# Patient Record
Sex: FEMALE | Race: WHITE | NOT HISPANIC OR LATINO
[De-identification: names, ages, dates, MRNs, and addresses within clinical notes are randomized per-mention and may not be internally consistent; named-entity substitution may affect disease eponyms.]

---

## 2019-04-14 LAB
MEV IGG FLD QL IA: >300 AU/ML
MEV IGG+IGM SER-IMP: POSITIVE

## 2019-04-16 ENCOUNTER — RECORD ABSTRACTING (OUTPATIENT)
Age: 39
End: 2019-04-16

## 2019-04-16 DIAGNOSIS — Z86.39 PERSONAL HISTORY OF OTHER ENDOCRINE, NUTRITIONAL AND METABOLIC DISEASE: ICD-10-CM

## 2019-04-16 DIAGNOSIS — Z78.9 OTHER SPECIFIED HEALTH STATUS: ICD-10-CM

## 2019-04-16 DIAGNOSIS — Z83.49 FAMILY HISTORY OF OTHER ENDOCRINE, NUTRITIONAL AND METABOLIC DISEASES: ICD-10-CM

## 2019-04-16 DIAGNOSIS — Z80.3 FAMILY HISTORY OF MALIGNANT NEOPLASM OF BREAST: ICD-10-CM

## 2019-06-12 ENCOUNTER — NON-APPOINTMENT (OUTPATIENT)
Age: 39
End: 2019-06-12

## 2019-06-12 ENCOUNTER — LABORATORY RESULT (OUTPATIENT)
Age: 39
End: 2019-06-12

## 2019-06-12 ENCOUNTER — APPOINTMENT (OUTPATIENT)
Dept: ENDOCRINOLOGY | Facility: CLINIC | Age: 39
End: 2019-06-12
Payer: COMMERCIAL

## 2019-06-12 ENCOUNTER — APPOINTMENT (OUTPATIENT)
Dept: CARDIOLOGY | Facility: CLINIC | Age: 39
End: 2019-06-12
Payer: COMMERCIAL

## 2019-06-12 VITALS
HEIGHT: 59 IN | HEART RATE: 81 BPM | SYSTOLIC BLOOD PRESSURE: 100 MMHG | OXYGEN SATURATION: 99 % | TEMPERATURE: 98.7 F | DIASTOLIC BLOOD PRESSURE: 60 MMHG | BODY MASS INDEX: 27.21 KG/M2 | WEIGHT: 135 LBS

## 2019-06-12 VITALS
HEIGHT: 59 IN | BODY MASS INDEX: 27.21 KG/M2 | SYSTOLIC BLOOD PRESSURE: 100 MMHG | OXYGEN SATURATION: 99 % | HEART RATE: 81 BPM | WEIGHT: 135 LBS | TEMPERATURE: 98.7 F | DIASTOLIC BLOOD PRESSURE: 60 MMHG

## 2019-06-12 DIAGNOSIS — Z87.2 PERSONAL HISTORY OF DISEASES OF THE SKIN AND SUBCUTANEOUS TISSUE: ICD-10-CM

## 2019-06-12 DIAGNOSIS — R05 COUGH: ICD-10-CM

## 2019-06-12 DIAGNOSIS — Z86.79 PERSONAL HISTORY OF OTHER DISEASES OF THE CIRCULATORY SYSTEM: ICD-10-CM

## 2019-06-12 PROCEDURE — 99213 OFFICE O/P EST LOW 20 MIN: CPT

## 2019-06-12 PROCEDURE — 93000 ELECTROCARDIOGRAM COMPLETE: CPT

## 2019-06-12 PROCEDURE — 99396 PREV VISIT EST AGE 40-64: CPT

## 2019-06-12 NOTE — PHYSICAL EXAM
[Well Nourished] : well nourished [No Acute Distress] : no acute distress [Well Developed] : well developed [Well-Appearing] : well-appearing [Normal Sclera/Conjunctiva] : normal sclera/conjunctiva [PERRL] : pupils equal round and reactive to light [EOMI] : extraocular movements intact [Normal Outer Ear/Nose] : the outer ears and nose were normal in appearance [Normal Oropharynx] : the oropharynx was normal [No JVD] : no jugular venous distention [Supple] : supple [No Lymphadenopathy] : no lymphadenopathy [No Respiratory Distress] : no respiratory distress  [Clear to Auscultation] : lungs were clear to auscultation bilaterally [No Accessory Muscle Use] : no accessory muscle use [Regular Rhythm] : with a regular rhythm [Normal Rate] : normal rate  [Normal S1, S2] : normal S1 and S2 [No Murmur] : no murmur heard [No Carotid Bruits] : no carotid bruits [No Abdominal Bruit] : a ~M bruit was not heard ~T in the abdomen [No Varicosities] : no varicosities [Pedal Pulses Present] : the pedal pulses are present [No Edema] : there was no peripheral edema [No Extremity Clubbing/Cyanosis] : no extremity clubbing/cyanosis [No Palpable Aorta] : no palpable aorta [Soft] : abdomen soft [Non Tender] : non-tender [No Masses] : no abdominal mass palpated [Non-distended] : non-distended [No HSM] : no HSM [Normal Bowel Sounds] : normal bowel sounds [Normal Posterior Cervical Nodes] : no posterior cervical lymphadenopathy [Normal Anterior Cervical Nodes] : no anterior cervical lymphadenopathy [No CVA Tenderness] : no CVA  tenderness [No Spinal Tenderness] : no spinal tenderness [No Joint Swelling] : no joint swelling [Grossly Normal Strength/Tone] : grossly normal strength/tone [No Rash] : no rash [Normal Gait] : normal gait [Coordination Grossly Intact] : coordination grossly intact [Deep Tendon Reflexes (DTR)] : deep tendon reflexes were 2+ and symmetric [No Focal Deficits] : no focal deficits [Normal Affect] : the affect was normal [Normal Insight/Judgement] : insight and judgment were intact [de-identified] : goiter noted  [de-identified] : mild increased expiratory phase  [de-identified] : freckles /nevi and psoriatic rash elbows

## 2019-06-12 NOTE — HISTORY OF PRESENT ILLNESS
[FreeTextEntry1] : physical  [de-identified] : pt presents for yearly physical pt feels well except pt with lingering cough x 1 week intermittently productive .pt denies sob /n/v diaphoresis

## 2019-06-15 LAB
25(OH)D3 SERPL-MCNC: 31.3 NG/ML
ALBUMIN SERPL ELPH-MCNC: 4.7 G/DL
ALP BLD-CCNC: 62 U/L
ALT SERPL-CCNC: 18 U/L
ANION GAP SERPL CALC-SCNC: 11 MMOL/L
AST SERPL-CCNC: 15 U/L
BASOPHILS # BLD AUTO: 0.02 K/UL
BASOPHILS NFR BLD AUTO: 0.3 %
BILIRUB SERPL-MCNC: 0.2 MG/DL
BUN SERPL-MCNC: 13 MG/DL
CALCIUM SERPL-MCNC: 10 MG/DL
CHLORIDE SERPL-SCNC: 102 MMOL/L
CHOLEST SERPL-MCNC: 185 MG/DL
CHOLEST/HDLC SERPL: 2.9 RATIO
CO2 SERPL-SCNC: 27 MMOL/L
CREAT SERPL-MCNC: 0.71 MG/DL
EOSINOPHIL # BLD AUTO: 0.06 K/UL
EOSINOPHIL NFR BLD AUTO: 0.8 %
ESTIMATED AVERAGE GLUCOSE: 100 MG/DL
FERRITIN SERPL-MCNC: 38 NG/ML
FOLATE SERPL-MCNC: 13.5 NG/ML
GLUCOSE SERPL-MCNC: 82 MG/DL
HAPTOGLOB SERPL-MCNC: 140 MG/DL
HBA1C MFR BLD HPLC: 5.1 %
HCT VFR BLD CALC: 42.1 %
HDLC SERPL-MCNC: 65 MG/DL
HGB BLD-MCNC: 13.6 G/DL
IMM GRANULOCYTES NFR BLD AUTO: 0.3 %
IRON SERPL-MCNC: 55 UG/DL
LDH SERPL-CCNC: 148 U/L
LDLC SERPL CALC-MCNC: 106 MG/DL
LYMPHOCYTES # BLD AUTO: 1.89 K/UL
LYMPHOCYTES NFR BLD AUTO: 24.9 %
MAGNESIUM SERPL-MCNC: 2.1 MG/DL
MAN DIFF?: NORMAL
MCHC RBC-ENTMCNC: 29.1 PG
MCHC RBC-ENTMCNC: 32.3 GM/DL
MCV RBC AUTO: 90.1 FL
MONOCYTES # BLD AUTO: 0.51 K/UL
MONOCYTES NFR BLD AUTO: 6.7 %
NEUTROPHILS # BLD AUTO: 5.08 K/UL
NEUTROPHILS NFR BLD AUTO: 67 %
PHOSPHATE SERPL-MCNC: 3.7 MG/DL
PLATELET # BLD AUTO: 265 K/UL
POTASSIUM SERPL-SCNC: 4.5 MMOL/L
PROT SERPL-MCNC: 7 G/DL
RBC # BLD: 4.67 M/UL
RBC # FLD: 12.3 %
SODIUM SERPL-SCNC: 140 MMOL/L
T3RU NFR SERPL: 0.9 TBI
T4 FREE SERPL-MCNC: 1.2 NG/DL
T4 SERPL-MCNC: 8 UG/DL
TRANSFERRIN SERPL-MCNC: 239 MG/DL
TRIGL SERPL-MCNC: 72 MG/DL
TSH SERPL-ACNC: 1.51 UIU/ML
URATE SERPL-MCNC: 3.1 MG/DL
VIT B12 SERPL-MCNC: 583 PG/ML
WBC # FLD AUTO: 7.58 K/UL

## 2019-06-21 LAB
T3FREE SERPL-MCNC: 3.32 PG/ML
THYROGLOB AB SERPL-ACNC: <20 IU/ML
THYROPEROXIDASE AB SERPL IA-ACNC: 125 IU/ML
TSH RECEPTOR AB: 2.32 IU/L
TSI ACT/NOR SER: 0.46 IU/L

## 2019-07-27 NOTE — HISTORY OF PRESENT ILLNESS
[FreeTextEntry1] : Ms. NEVILLE  is a 38 year  year old female  who presents for initial endocrine evaluation. She presents with regard to a history of f graves disease. She becky have been on ptu about 10 years ago but has been off for many yrs with normal tft's.   She is currently taking  ocp\par Additional medical history is otherwise negative. She did have a heterogenous thyroid but lst several thyroid US's were normal. Denies anterior neck discomfort, difficulty swallowing, or any change in the appearance of the neck region. No ophthalmologic s/s except some mild incr in tearing\par \par \par

## 2019-07-27 NOTE — PHYSICAL EXAM
[No Acute Distress] : no acute distress [Alert] : alert [Normal Sclera/Conjunctiva] : normal sclera/conjunctiva [Well Developed] : well developed [Well Nourished] : well nourished [No Proptosis] : no proptosis [EOMI] : extra ocular movement intact [Normal Oropharynx] : the oropharynx was normal [No Respiratory Distress] : no respiratory distress [Thyroid Not Enlarged] : the thyroid was not enlarged [No Thyroid Nodules] : there were no palpable thyroid nodules [No Accessory Muscle Use] : no accessory muscle use [Clear to Auscultation] : lungs were clear to auscultation bilaterally [Normal Rate] : heart rate was normal  [Normal S1, S2] : normal S1 and S2 [Regular Rhythm] : with a regular rhythm [Pedal Pulses Normal] : the pedal pulses are present [No Edema] : there was no peripheral edema [Normal Bowel Sounds] : normal bowel sounds [Not Distended] : not distended [Soft] : abdomen soft [Not Tender] : non-tender [Axillary Nodes] : axillary nodes [Anterior Cervical Nodes] : anterior cervical nodes [Post Cervical Nodes] : posterior cervical nodes [No Spinal Tenderness] : no spinal tenderness [Normal] : normal and non tender [Spine Straight] : spine straight [No Stigmata of Cushings Syndrome] : no stigmata of cushings syndrome [Normal Gait] : normal gait [Normal Strength/Tone] : muscle strength and tone were normal [No Rash] : no rash [Normal Reflexes] : deep tendon reflexes were 2+ and symmetric [Oriented x3] : oriented to person, place, and time [No Tremors] : no tremors [Acanthosis Nigricans] : no acanthosis nigricans

## 2019-09-28 LAB
APPEARANCE: CLEAR
BACTERIA: NEGATIVE
BILIRUBIN URINE: NEGATIVE
BLOOD URINE: NEGATIVE
COLOR: COLORLESS
GLUCOSE QUALITATIVE U: NEGATIVE
HYALINE CASTS: 0 /LPF
KETONES URINE: NEGATIVE
LEUKOCYTE ESTERASE URINE: NEGATIVE
MICROSCOPIC-UA: NORMAL
NITRITE URINE: NEGATIVE
PH URINE: 7
PROTEIN URINE: NEGATIVE
RED BLOOD CELLS URINE: 2 /HPF
SPECIFIC GRAVITY URINE: 1
SQUAMOUS EPITHELIAL CELLS: 0 /HPF
UROBILINOGEN URINE: NORMAL
WHITE BLOOD CELLS URINE: 0 /HPF

## 2020-09-22 ENCOUNTER — NON-APPOINTMENT (OUTPATIENT)
Age: 40
End: 2020-09-22

## 2020-09-22 ENCOUNTER — LABORATORY RESULT (OUTPATIENT)
Age: 40
End: 2020-09-22

## 2020-09-22 ENCOUNTER — APPOINTMENT (OUTPATIENT)
Dept: CARDIOLOGY | Facility: CLINIC | Age: 40
End: 2020-09-22
Payer: COMMERCIAL

## 2020-09-22 VITALS
SYSTOLIC BLOOD PRESSURE: 100 MMHG | TEMPERATURE: 98.4 F | OXYGEN SATURATION: 99 % | BODY MASS INDEX: 27.42 KG/M2 | WEIGHT: 136 LBS | HEART RATE: 88 BPM | HEIGHT: 59 IN | DIASTOLIC BLOOD PRESSURE: 60 MMHG

## 2020-09-22 DIAGNOSIS — R07.89 OTHER CHEST PAIN: ICD-10-CM

## 2020-09-22 DIAGNOSIS — Z23 ENCOUNTER FOR IMMUNIZATION: ICD-10-CM

## 2020-09-22 DIAGNOSIS — E04.1 NONTOXIC SINGLE THYROID NODULE: ICD-10-CM

## 2020-09-22 DIAGNOSIS — Z20.828 CONTACT WITH AND (SUSPECTED) EXPOSURE TO OTHER VIRAL COMMUNICABLE DISEASES: ICD-10-CM

## 2020-09-22 PROCEDURE — 90686 IIV4 VACC NO PRSV 0.5 ML IM: CPT

## 2020-09-22 PROCEDURE — 93000 ELECTROCARDIOGRAM COMPLETE: CPT

## 2020-09-22 PROCEDURE — 99396 PREV VISIT EST AGE 40-64: CPT | Mod: 25

## 2020-09-22 PROCEDURE — 93306 TTE W/DOPPLER COMPLETE: CPT

## 2020-09-22 PROCEDURE — G0008: CPT

## 2020-09-22 NOTE — REVIEW OF SYSTEMS
[Chest Pain] : chest pain [Palpitations] : palpitations [Shortness Of Breath] : shortness of breath [Negative] : Heme/Lymph [Leg Claudication] : no leg claudication [Lower Ext Edema] : no lower extremity edema [Orthopnea] : no orthopnea [Paroxysmal Nocturnal Dyspnea] : no paroxysmal nocturnal dyspnea [Wheezing] : no wheezing [Cough] : no cough [Dyspnea on Exertion] : no dyspnea on exertion

## 2020-09-22 NOTE — PHYSICAL EXAM
[No Acute Distress] : no acute distress [Well Nourished] : well nourished [Well Developed] : well developed [Well-Appearing] : well-appearing [Normal Sclera/Conjunctiva] : normal sclera/conjunctiva [PERRL] : pupils equal round and reactive to light [EOMI] : extraocular movements intact [Normal Outer Ear/Nose] : the outer ears and nose were normal in appearance [Normal Oropharynx] : the oropharynx was normal [No JVD] : no jugular venous distention [No Lymphadenopathy] : no lymphadenopathy [Supple] : supple [Thyroid Normal, No Nodules] : the thyroid was normal and there were no nodules present [No Respiratory Distress] : no respiratory distress  [No Accessory Muscle Use] : no accessory muscle use [Clear to Auscultation] : lungs were clear to auscultation bilaterally [Normal Rate] : normal rate  [Regular Rhythm] : with a regular rhythm [Normal S1, S2] : normal S1 and S2 [No Murmur] : no murmur heard [No Carotid Bruits] : no carotid bruits [No Abdominal Bruit] : a ~M bruit was not heard ~T in the abdomen [No Varicosities] : no varicosities [Pedal Pulses Present] : the pedal pulses are present [No Edema] : there was no peripheral edema [No Palpable Aorta] : no palpable aorta [No Extremity Clubbing/Cyanosis] : no extremity clubbing/cyanosis [Soft] : abdomen soft [Non Tender] : non-tender [Non-distended] : non-distended [No Masses] : no abdominal mass palpated [No HSM] : no HSM [Normal Bowel Sounds] : normal bowel sounds [Normal Posterior Cervical Nodes] : no posterior cervical lymphadenopathy [Normal Anterior Cervical Nodes] : no anterior cervical lymphadenopathy [No CVA Tenderness] : no CVA  tenderness [No Spinal Tenderness] : no spinal tenderness [No Joint Swelling] : no joint swelling [Grossly Normal Strength/Tone] : grossly normal strength/tone [No Rash] : no rash [Coordination Grossly Intact] : coordination grossly intact [No Focal Deficits] : no focal deficits [Normal Gait] : normal gait [Normal Affect] : the affect was normal [Normal Insight/Judgement] : insight and judgment were intact [de-identified] : goiter noted  [de-identified] : nevi

## 2020-09-22 NOTE — HISTORY OF PRESENT ILLNESS
[FreeTextEntry1] : Annual Wellness Exam  [de-identified] : This is a 39 year old lady with a PMH of Hyperthyroidism and thyroid Nodules presents told her annual wellness exam. Patient states that for the last week she has noticed that her heart has been racing consistently. Patient admits to at times having to take a deep breath. Patient with chest heaviness that is an overall feeling. She states that it is not a specific isolated event. Patient denies nausea, vomiting, dizziness and lightheadedness.\par

## 2020-09-24 ENCOUNTER — APPOINTMENT (OUTPATIENT)
Dept: CARDIOLOGY | Facility: CLINIC | Age: 40
End: 2020-09-24
Payer: COMMERCIAL

## 2020-09-24 ENCOUNTER — NON-APPOINTMENT (OUTPATIENT)
Age: 40
End: 2020-09-24

## 2020-09-24 PROCEDURE — 93224 XTRNL ECG REC UP TO 48 HRS: CPT

## 2020-09-30 DIAGNOSIS — Z87.440 PERSONAL HISTORY OF URINARY (TRACT) INFECTIONS: ICD-10-CM

## 2020-10-13 ENCOUNTER — APPOINTMENT (OUTPATIENT)
Dept: CARDIOLOGY | Facility: CLINIC | Age: 40
End: 2020-10-13

## 2020-12-23 PROBLEM — Z87.440 HISTORY OF URINARY TRACT INFECTION: Status: RESOLVED | Noted: 2020-09-30 | Resolved: 2020-12-23

## 2020-12-27 LAB
25(OH)D3 SERPL-MCNC: 30.6 NG/ML
ANION GAP SERPL CALC-SCNC: 10 MMOL/L
ANION GAP SERPL CALC-SCNC: 11 MMOL/L
APPEARANCE: ABNORMAL
APPEARANCE: CLEAR
BACTERIA UR CULT: ABNORMAL
BACTERIA: NEGATIVE
BACTERIA: NEGATIVE
BASOPHILS # BLD AUTO: 0.03 K/UL
BASOPHILS NFR BLD AUTO: 0.4 %
BILIRUBIN URINE: NEGATIVE
BILIRUBIN URINE: NEGATIVE
BLOOD URINE: NEGATIVE
BLOOD URINE: NEGATIVE
BUN SERPL-MCNC: 14 MG/DL
BUN SERPL-MCNC: 15 MG/DL
CALCIUM OXALATE CRYSTALS: ABNORMAL
CALCIUM SERPL-MCNC: 9.4 MG/DL
CALCIUM SERPL-MCNC: 9.6 MG/DL
CHLORIDE SERPL-SCNC: 104 MMOL/L
CHLORIDE SERPL-SCNC: 105 MMOL/L
CHOLEST SERPL-MCNC: 210 MG/DL
CHOLEST/HDLC SERPL: 3.2 RATIO
CO2 SERPL-SCNC: 24 MMOL/L
CO2 SERPL-SCNC: 27 MMOL/L
COLOR: YELLOW
COLOR: YELLOW
CREAT SERPL-MCNC: 0.69 MG/DL
CREAT SERPL-MCNC: 0.7 MG/DL
EOSINOPHIL # BLD AUTO: 0.03 K/UL
EOSINOPHIL NFR BLD AUTO: 0.4 %
ESTIMATED AVERAGE GLUCOSE: 97 MG/DL
GLUCOSE QUALITATIVE U: NEGATIVE
GLUCOSE QUALITATIVE U: NEGATIVE
GLUCOSE SERPL-MCNC: 51 MG/DL
GLUCOSE SERPL-MCNC: 85 MG/DL
HBA1C MFR BLD HPLC: 5 %
HCG SERPL-MCNC: <1 MIU/ML
HCT VFR BLD CALC: 40.8 %
HDLC SERPL-MCNC: 66 MG/DL
HGB BLD-MCNC: 12.8 G/DL
HYALINE CASTS: 1 /LPF
HYALINE CASTS: 1 /LPF
IMM GRANULOCYTES NFR BLD AUTO: 0.3 %
KETONES URINE: NEGATIVE
KETONES URINE: NORMAL
LDLC SERPL CALC-MCNC: 125 MG/DL
LEUKOCYTE ESTERASE URINE: NEGATIVE
LEUKOCYTE ESTERASE URINE: NEGATIVE
LYMPHOCYTES # BLD AUTO: 2.18 K/UL
LYMPHOCYTES NFR BLD AUTO: 27.3 %
MAGNESIUM SERPL-MCNC: 2.3 MG/DL
MAN DIFF?: NORMAL
MCHC RBC-ENTMCNC: 28.8 PG
MCHC RBC-ENTMCNC: 31.4 GM/DL
MCV RBC AUTO: 91.7 FL
MICROSCOPIC-UA: NORMAL
MICROSCOPIC-UA: NORMAL
MONOCYTES # BLD AUTO: 0.65 K/UL
MONOCYTES NFR BLD AUTO: 8.1 %
NEUTROPHILS # BLD AUTO: 5.09 K/UL
NEUTROPHILS NFR BLD AUTO: 63.5 %
NITRITE URINE: NEGATIVE
NITRITE URINE: NEGATIVE
PH URINE: 6
PH URINE: 7
PHOSPHATE SERPL-MCNC: 3.6 MG/DL
PLATELET # BLD AUTO: 251 K/UL
POTASSIUM SERPL-SCNC: 3.7 MMOL/L
POTASSIUM SERPL-SCNC: 4.2 MMOL/L
PROTEIN URINE: NORMAL
PROTEIN URINE: NORMAL
RBC # BLD: 4.45 M/UL
RBC # FLD: 13.5 %
RED BLOOD CELLS URINE: 2 /HPF
RED BLOOD CELLS URINE: 7 /HPF
SARS-COV-2 IGG SERPL IA-ACNC: 6.12 INDEX
SARS-COV-2 IGG SERPL QL IA: POSITIVE
SODIUM SERPL-SCNC: 139 MMOL/L
SODIUM SERPL-SCNC: 143 MMOL/L
SPECIFIC GRAVITY URINE: 1.03
SPECIFIC GRAVITY URINE: 1.03
SQUAMOUS EPITHELIAL CELLS: 15 /HPF
SQUAMOUS EPITHELIAL CELLS: 2 /HPF
T3RU NFR SERPL: 1.2 TBI
T4 FREE SERPL-MCNC: 1.1 NG/DL
T4 SERPL-MCNC: 8 UG/DL
TRIGL SERPL-MCNC: 89 MG/DL
TSH SERPL-ACNC: 1.91 UIU/ML
URATE SERPL-MCNC: 3 MG/DL
UROBILINOGEN URINE: ABNORMAL
UROBILINOGEN URINE: NORMAL
WBC # FLD AUTO: 8 K/UL
WHITE BLOOD CELLS URINE: 1 /HPF
WHITE BLOOD CELLS URINE: 3 /HPF

## 2021-11-17 ENCOUNTER — NON-APPOINTMENT (OUTPATIENT)
Age: 41
End: 2021-11-17

## 2021-11-17 ENCOUNTER — APPOINTMENT (OUTPATIENT)
Dept: ENDOCRINOLOGY | Facility: CLINIC | Age: 41
End: 2021-11-17
Payer: COMMERCIAL

## 2021-11-17 ENCOUNTER — APPOINTMENT (OUTPATIENT)
Dept: CARDIOLOGY | Facility: CLINIC | Age: 41
End: 2021-11-17
Payer: COMMERCIAL

## 2021-11-17 VITALS
BODY MASS INDEX: 28.22 KG/M2 | SYSTOLIC BLOOD PRESSURE: 108 MMHG | OXYGEN SATURATION: 99 % | DIASTOLIC BLOOD PRESSURE: 60 MMHG | HEIGHT: 59 IN | HEART RATE: 76 BPM | WEIGHT: 140 LBS | TEMPERATURE: 98.4 F

## 2021-11-17 VITALS
WEIGHT: 140 LBS | BODY MASS INDEX: 28.22 KG/M2 | OXYGEN SATURATION: 99 % | SYSTOLIC BLOOD PRESSURE: 110 MMHG | HEIGHT: 59 IN | DIASTOLIC BLOOD PRESSURE: 65 MMHG | HEART RATE: 80 BPM

## 2021-11-17 DIAGNOSIS — L40.9 PSORIASIS, UNSPECIFIED: ICD-10-CM

## 2021-11-17 PROCEDURE — 99396 PREV VISIT EST AGE 40-64: CPT

## 2021-11-17 PROCEDURE — 36415 COLL VENOUS BLD VENIPUNCTURE: CPT

## 2021-11-17 PROCEDURE — 76536 US EXAM OF HEAD AND NECK: CPT

## 2021-11-17 PROCEDURE — 99214 OFFICE O/P EST MOD 30 MIN: CPT | Mod: 25

## 2021-11-17 PROCEDURE — 93000 ELECTROCARDIOGRAM COMPLETE: CPT

## 2021-11-17 NOTE — PROCEDURE
[SightCine e 2008 model, 10-12 MHz frequencies] : multiple real time longitudinal and transverse images were obtained using a high resolution ultrasound with a linear transducer, SightCine e 2008 model, 10-12 MHz frequencies. All measurements will be reported as longitudinal x jessee-posterior x transverse. [Multinodular Goiter] : multinodular goiter [] : a heterogeneous parenchyma [There are no distinct nodules visualized.] : There are no distinct nodules visualized. [FreeTextEntry1] : 2.42 x 1.55 x 1.71 [FreeTextEntry5] : 2.65 x 1.88 x 1.43 [FreeTextEntry2] : 0.37

## 2021-11-17 NOTE — PHYSICAL EXAM
[No Acute Distress] : no acute distress [Well Nourished] : well nourished [Well Developed] : well developed [Well-Appearing] : well-appearing [Normal Sclera/Conjunctiva] : normal sclera/conjunctiva [PERRL] : pupils equal round and reactive to light [EOMI] : extraocular movements intact [Normal Outer Ear/Nose] : the outer ears and nose were normal in appearance [Normal Oropharynx] : the oropharynx was normal [No JVD] : no jugular venous distention [No Lymphadenopathy] : no lymphadenopathy [Supple] : supple [Thyroid Normal, No Nodules] : the thyroid was normal and there were no nodules present [No Respiratory Distress] : no respiratory distress  [No Accessory Muscle Use] : no accessory muscle use [Clear to Auscultation] : lungs were clear to auscultation bilaterally [Normal Rate] : normal rate  [Regular Rhythm] : with a regular rhythm [Normal S1, S2] : normal S1 and S2 [No Murmur] : no murmur heard [No Carotid Bruits] : no carotid bruits [No Abdominal Bruit] : a ~M bruit was not heard ~T in the abdomen [No Varicosities] : no varicosities [Pedal Pulses Present] : the pedal pulses are present [No Edema] : there was no peripheral edema [No Palpable Aorta] : no palpable aorta [No Extremity Clubbing/Cyanosis] : no extremity clubbing/cyanosis [Soft] : abdomen soft [Non Tender] : non-tender [Non-distended] : non-distended [No Masses] : no abdominal mass palpated [No HSM] : no HSM [Normal Bowel Sounds] : normal bowel sounds [Normal Posterior Cervical Nodes] : no posterior cervical lymphadenopathy [Normal Anterior Cervical Nodes] : no anterior cervical lymphadenopathy [No CVA Tenderness] : no CVA  tenderness [No Spinal Tenderness] : no spinal tenderness [No Joint Swelling] : no joint swelling [Grossly Normal Strength/Tone] : grossly normal strength/tone [No Rash] : no rash [Coordination Grossly Intact] : coordination grossly intact [No Focal Deficits] : no focal deficits [Normal Gait] : normal gait [Deep Tendon Reflexes (DTR)] : deep tendon reflexes were 2+ and symmetric [Normal Affect] : the affect was normal [Normal Insight/Judgement] : insight and judgment were intact [de-identified] : mago

## 2021-11-17 NOTE — HISTORY OF PRESENT ILLNESS
[FreeTextEntry1] : Annual Wellness Exam [de-identified] : This is a 41 year old lady with a PMH of Hyperthyroidism and thyroid Nodules presents told her annual wellness exam. No acute complaints or concerns at today's visit. She overall feels well today. Denies chest pain, sob, dizziness, palpitations or nausea.

## 2021-11-17 NOTE — IMPRESSION
[FreeTextEntry1] : Heterogenous thyroid bilaterally without distinct nodularity and no goiter.  I have discussed this finding with the patient and have suggested  thyroid ultrasound follow up on an as needed basis.\par

## 2021-11-17 NOTE — PROCEDURE
[Cuciniale e 2008 model, 10-12 MHz frequencies] : multiple real time longitudinal and transverse images were obtained using a high resolution ultrasound with a linear transducer, Cuciniale e 2008 model, 10-12 MHz frequencies. All measurements will be reported as longitudinal x jessee-posterior x transverse. [Multinodular Goiter] : multinodular goiter [] : a heterogeneous parenchyma [There are no distinct nodules visualized.] : There are no distinct nodules visualized. [FreeTextEntry1] : 2.42 x 1.55 x 1.71 [FreeTextEntry5] : 2.65 x 1.88 x 1.43 [FreeTextEntry2] : 0.37

## 2021-11-21 DIAGNOSIS — R82.90 UNSPECIFIED ABNORMAL FINDINGS IN URINE: ICD-10-CM

## 2021-11-21 LAB
25(OH)D3 SERPL-MCNC: 30.4 NG/ML
ALBUMIN SERPL ELPH-MCNC: 4.4 G/DL
ALP BLD-CCNC: 56 U/L
ALT SERPL-CCNC: 15 U/L
ANION GAP SERPL CALC-SCNC: 11 MMOL/L
APPEARANCE: CLEAR
AST SERPL-CCNC: 14 U/L
BACTERIA UR CULT: NORMAL
BACTERIA: NEGATIVE
BASOPHILS # BLD AUTO: 0.02 K/UL
BASOPHILS NFR BLD AUTO: 0.3 %
BILIRUB SERPL-MCNC: 0.2 MG/DL
BILIRUBIN URINE: NEGATIVE
BLOOD URINE: ABNORMAL
BUN SERPL-MCNC: 13 MG/DL
CALCIUM SERPL-MCNC: 9.7 MG/DL
CHLORIDE SERPL-SCNC: 104 MMOL/L
CHOLEST SERPL-MCNC: 221 MG/DL
CO2 SERPL-SCNC: 24 MMOL/L
COLOR: NORMAL
COVID-19 NUCLEOCAPSID  GAM ANTIBODY INTERPRETATION: POSITIVE
CREAT SERPL-MCNC: 0.64 MG/DL
EOSINOPHIL # BLD AUTO: 0.06 K/UL
EOSINOPHIL NFR BLD AUTO: 0.9 %
FERRITIN SERPL-MCNC: 35 NG/ML
FOLATE SERPL-MCNC: 12.1 NG/ML
GLUCOSE QUALITATIVE U: NEGATIVE
GLUCOSE SERPL-MCNC: 81 MG/DL
HAPTOGLOB SERPL-MCNC: 110 MG/DL
HCT VFR BLD CALC: 40.4 %
HDLC SERPL-MCNC: 67 MG/DL
HGB BLD-MCNC: 12.6 G/DL
HYALINE CASTS: 1 /LPF
IMM GRANULOCYTES NFR BLD AUTO: 0.3 %
IRON SERPL-MCNC: 53 UG/DL
KETONES URINE: NEGATIVE
LDH SERPL-CCNC: 150 U/L
LDLC SERPL CALC-MCNC: 128 MG/DL
LEUKOCYTE ESTERASE URINE: NEGATIVE
LYMPHOCYTES # BLD AUTO: 1.77 K/UL
LYMPHOCYTES NFR BLD AUTO: 25.1 %
MAGNESIUM SERPL-MCNC: 2.1 MG/DL
MAN DIFF?: NORMAL
MCHC RBC-ENTMCNC: 28.4 PG
MCHC RBC-ENTMCNC: 31.2 GM/DL
MCV RBC AUTO: 91.2 FL
MICROSCOPIC-UA: NORMAL
MONOCYTES # BLD AUTO: 0.52 K/UL
MONOCYTES NFR BLD AUTO: 7.4 %
NEUTROPHILS # BLD AUTO: 4.65 K/UL
NEUTROPHILS NFR BLD AUTO: 66 %
NITRITE URINE: NEGATIVE
NONHDLC SERPL-MCNC: 154 MG/DL
PH URINE: 7
PHOSPHATE SERPL-MCNC: 3.6 MG/DL
PLATELET # BLD AUTO: 270 K/UL
POTASSIUM SERPL-SCNC: 4.3 MMOL/L
PROT SERPL-MCNC: 6.9 G/DL
PROTEIN URINE: NEGATIVE
RBC # BLD: 4.43 M/UL
RBC # FLD: 12.8 %
RED BLOOD CELLS URINE: 4 /HPF
SARS-COV-2 AB SERPL QL IA: 1.27 INDEX
SODIUM SERPL-SCNC: 139 MMOL/L
SPECIFIC GRAVITY URINE: 1.01
SQUAMOUS EPITHELIAL CELLS: 1 /HPF
TRANSFERRIN SERPL-MCNC: 272 MG/DL
TRIGL SERPL-MCNC: 128 MG/DL
UROBILINOGEN URINE: NORMAL
VIT B12 SERPL-MCNC: 472 PG/ML
WBC # FLD AUTO: 7.04 K/UL
WHITE BLOOD CELLS URINE: 1 /HPF

## 2021-11-22 LAB
T3FREE SERPL-MCNC: 2.67 PG/ML
T4 FREE SERPL-MCNC: 1 NG/DL
THYROGLOB AB SERPL-ACNC: 23 IU/ML
THYROPEROXIDASE AB SERPL IA-ACNC: 208 IU/ML
TSH SERPL-ACNC: 1.92 UIU/ML

## 2021-11-25 NOTE — HISTORY OF PRESENT ILLNESS
[FreeTextEntry1] : Ms. NEVILLE is a 41 year old female who returns today for endocrine reevaluation. She presents with regard to a history of f graves disease. She may have been on ptu about 10 years ago but has been off for many yrs with normal tft's. She is currently taking ocp.\par Additional medical history is otherwise negative. She did have a heterogenous thyroid but lst several thyroid US's were normal. Denies anterior neck discomfort, difficulty swallowing, or any change in the appearance of the neck region. No ophthalmologic s/s except some mild incr in tearing.\par Taking Zyrtec, and vitamin D intermittently.\par

## 2023-01-18 ENCOUNTER — LABORATORY RESULT (OUTPATIENT)
Age: 43
End: 2023-01-18

## 2023-01-18 ENCOUNTER — APPOINTMENT (OUTPATIENT)
Dept: CARDIOLOGY | Facility: CLINIC | Age: 43
End: 2023-01-18
Payer: COMMERCIAL

## 2023-01-18 ENCOUNTER — NON-APPOINTMENT (OUTPATIENT)
Age: 43
End: 2023-01-18

## 2023-01-18 VITALS
SYSTOLIC BLOOD PRESSURE: 103 MMHG | HEIGHT: 59 IN | OXYGEN SATURATION: 98 % | BODY MASS INDEX: 27.82 KG/M2 | WEIGHT: 138 LBS | TEMPERATURE: 97.8 F | DIASTOLIC BLOOD PRESSURE: 60 MMHG | HEART RATE: 88 BPM

## 2023-01-18 DIAGNOSIS — Z12.39 ENCOUNTER FOR OTHER SCREENING FOR MALIGNANT NEOPLASM OF BREAST: ICD-10-CM

## 2023-01-18 DIAGNOSIS — Z00.00 ENCOUNTER FOR GENERAL ADULT MEDICAL EXAMINATION W/OUT ABNORMAL FINDINGS: ICD-10-CM

## 2023-01-18 DIAGNOSIS — D22.9 MELANOCYTIC NEVI, UNSPECIFIED: ICD-10-CM

## 2023-01-18 DIAGNOSIS — R82.90 UNSPECIFIED ABNORMAL FINDINGS IN URINE: ICD-10-CM

## 2023-01-18 PROCEDURE — 93000 ELECTROCARDIOGRAM COMPLETE: CPT

## 2023-01-18 PROCEDURE — 99396 PREV VISIT EST AGE 40-64: CPT

## 2023-01-18 RX ORDER — BENZONATATE 200 MG/1
200 CAPSULE ORAL
Qty: 40 | Refills: 0 | Status: DISCONTINUED | COMMUNITY
Start: 2019-06-12 | End: 2023-01-18

## 2023-01-18 RX ORDER — GUAIFENESIN 600 MG/1
600 TABLET, EXTENDED RELEASE ORAL
Refills: 0 | Status: DISCONTINUED | COMMUNITY
Start: 2019-06-12 | End: 2023-01-18

## 2023-01-18 RX ORDER — AZITHROMYCIN 250 MG/1
250 TABLET, FILM COATED ORAL
Qty: 1 | Refills: 0 | Status: DISCONTINUED | COMMUNITY
Start: 2019-06-12 | End: 2023-01-18

## 2023-01-18 RX ORDER — NITROFURANTOIN (MONOHYDRATE/MACROCRYSTALS) 25; 75 MG/1; MG/1
100 CAPSULE ORAL
Qty: 14 | Refills: 0 | Status: DISCONTINUED | COMMUNITY
Start: 2020-09-30 | End: 2023-01-18

## 2023-01-18 NOTE — PHYSICAL EXAM
[No Acute Distress] : no acute distress [Well Nourished] : well nourished [Well Developed] : well developed [Well-Appearing] : well-appearing [Normal Sclera/Conjunctiva] : normal sclera/conjunctiva [PERRL] : pupils equal round and reactive to light [EOMI] : extraocular movements intact [Normal Outer Ear/Nose] : the outer ears and nose were normal in appearance [Normal Oropharynx] : the oropharynx was normal [No JVD] : no jugular venous distention [No Lymphadenopathy] : no lymphadenopathy [Supple] : supple [Thyroid Normal, No Nodules] : the thyroid was normal and there were no nodules present [No Respiratory Distress] : no respiratory distress  [No Accessory Muscle Use] : no accessory muscle use [Clear to Auscultation] : lungs were clear to auscultation bilaterally [Normal Rate] : normal rate  [Regular Rhythm] : with a regular rhythm [Normal S1, S2] : normal S1 and S2 [No Murmur] : no murmur heard [No Carotid Bruits] : no carotid bruits [No Abdominal Bruit] : a ~M bruit was not heard ~T in the abdomen [No Varicosities] : no varicosities [Pedal Pulses Present] : the pedal pulses are present [No Edema] : there was no peripheral edema [No Palpable Aorta] : no palpable aorta [No Extremity Clubbing/Cyanosis] : no extremity clubbing/cyanosis [Soft] : abdomen soft [Non Tender] : non-tender [Non-distended] : non-distended [No Masses] : no abdominal mass palpated [No HSM] : no HSM [Normal Bowel Sounds] : normal bowel sounds [Normal Posterior Cervical Nodes] : no posterior cervical lymphadenopathy [Normal Anterior Cervical Nodes] : no anterior cervical lymphadenopathy [No CVA Tenderness] : no CVA  tenderness [No Spinal Tenderness] : no spinal tenderness [No Joint Swelling] : no joint swelling [Grossly Normal Strength/Tone] : grossly normal strength/tone [No Rash] : no rash [Coordination Grossly Intact] : coordination grossly intact [No Focal Deficits] : no focal deficits [Normal Gait] : normal gait [Deep Tendon Reflexes (DTR)] : deep tendon reflexes were 2+ and symmetric [Normal Affect] : the affect was normal [Normal Insight/Judgement] : insight and judgment were intact [de-identified] : nevi

## 2023-01-18 NOTE — HISTORY OF PRESENT ILLNESS
[FreeTextEntry1] : Here for follow up  [de-identified] : This is a 41 y/o female with a pmhx of Hyperthyroidism and thyroid Nodules presents told her annual wellness exam. Patient feels well and has no acute concerns or complaints. Patient denies chest pain, dyspnea, palpitations, dizziness, syncope, changes in bowel/bladder habits or appetite.

## 2023-04-24 ENCOUNTER — APPOINTMENT (OUTPATIENT)
Dept: ORTHOPEDIC SURGERY | Facility: CLINIC | Age: 43
End: 2023-04-24
Payer: COMMERCIAL

## 2023-04-24 VITALS — HEIGHT: 59 IN | WEIGHT: 136 LBS | BODY MASS INDEX: 27.42 KG/M2

## 2023-04-24 PROCEDURE — 73630 X-RAY EXAM OF FOOT: CPT | Mod: 50

## 2023-04-24 PROCEDURE — 99204 OFFICE O/P NEW MOD 45 MIN: CPT

## 2023-04-24 PROCEDURE — 99203 OFFICE O/P NEW LOW 30 MIN: CPT

## 2023-04-24 NOTE — PHYSICAL EXAM
[Left] : left foot and ankle [Right] : right foot and ankle [NL (40)] : plantar flexion 40 degrees [NL (20)] : eversion 20 degrees [5___] : [facility] 5[unfilled]/5 [2+] : posterior tibialis pulse: 2+ [Normal] : saphenous nerve sensation normal [Bilateral] : foot bilaterally [Weight -] : weightbearing [] : non-antalgic [FreeTextEntry3] : Pes planus, LT worse than RT. There is hind foot valgus on the LT compared to the RT.  [de-identified] : There is pes planus, talonavicular joint narrowing with dorsal osteophyte formation on the LT. 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  [TWNoteComboBox7] : dorsiflexion 10 degrees [de-identified] : inversion 20 degrees

## 2023-04-24 NOTE — HISTORY OF PRESENT ILLNESS
[Gradual] : gradual [8] : 8 [0] : 0 [Occasional] : occasional [Standing] : standing [Walking] : walking [de-identified] : Pt is a 42 year old F who presents today for evaluation of both feet, LT worse than RT. Denies trauma. Symptoms x years. WB without assistive device. [] : no [FreeTextEntry1] : R/L foot

## 2023-05-03 ENCOUNTER — APPOINTMENT (OUTPATIENT)
Dept: CT IMAGING | Facility: CLINIC | Age: 43
End: 2023-05-03

## 2023-05-15 ENCOUNTER — APPOINTMENT (OUTPATIENT)
Dept: ORTHOPEDIC SURGERY | Facility: CLINIC | Age: 43
End: 2023-05-15
Payer: COMMERCIAL

## 2023-05-15 DIAGNOSIS — M79.671 PAIN IN RIGHT FOOT: ICD-10-CM

## 2023-05-15 DIAGNOSIS — M79.672 PAIN IN LEFT FOOT: ICD-10-CM

## 2023-05-15 DIAGNOSIS — M25.672 STIFFNESS OF LEFT ANKLE, NOT ELSEWHERE CLASSIFIED: ICD-10-CM

## 2023-05-15 PROCEDURE — 99213 OFFICE O/P EST LOW 20 MIN: CPT

## 2023-05-15 NOTE — HISTORY OF PRESENT ILLNESS
[Gradual] : gradual [8] : 8 [0] : 0 [Dull/Aching] : dull/aching [Occasional] : occasional [Standing] : standing [Walking] : walking [de-identified] : Patient presents today for follow up of both feet, LT worse than RT. Denies trauma. Symptoms x years. WB without assistive device.  She has tried nsaids, icing  and home exercises without improvement.  She is using otc inserts with some benefit.\par CT scan  got denied by insurance. \par  [] : no [FreeTextEntry1] : R/L foot

## 2023-05-15 NOTE — ASSESSMENT
[FreeTextEntry1] : Patient has failed conservative treatment  of home ROM exercises, icing and nsaids without improvement. \par \par She is still recommend CT LT ankle to evaluate for tarsal coalition. \par \par WBAT in supportive footwear.\par Ice to affected area, NSAIDS prn.\par \par Rx for custom orthotics given today.\par \par Further treatment pending CT results. \par \par \par \par

## 2023-05-15 NOTE — PHYSICAL EXAM
[Left] : left foot and ankle [Right] : right foot and ankle [NL (40)] : plantar flexion 40 degrees [NL (20)] : eversion 20 degrees [5___] : [facility] 5[unfilled]/5 [2+] : posterior tibialis pulse: 2+ [Normal] : saphenous nerve sensation normal [Bilateral] : foot bilaterally [Weight -] : weightbearing [de-identified] : There is pes planus, talonavicular joint narrowing with dorsal osteophyte formation on the LT. 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  [] : non-antalgic [FreeTextEntry3] : Pes planus, LT worse than RT. There is hind foot valgus on the LT compared to the RT.  [TWNoteComboBox7] : dorsiflexion 10 degrees [de-identified] : inversion 20 degrees

## 2023-05-23 ENCOUNTER — APPOINTMENT (OUTPATIENT)
Dept: ORTHOPEDIC SURGERY | Facility: CLINIC | Age: 43
End: 2023-05-23

## 2023-05-30 ENCOUNTER — FORM ENCOUNTER (OUTPATIENT)
Age: 43
End: 2023-05-30

## 2023-05-31 ENCOUNTER — APPOINTMENT (OUTPATIENT)
Dept: CT IMAGING | Facility: CLINIC | Age: 43
End: 2023-05-31
Payer: COMMERCIAL

## 2023-05-31 PROCEDURE — 73700 CT LOWER EXTREMITY W/O DYE: CPT | Mod: LT

## 2023-05-31 PROCEDURE — 76376 3D RENDER W/INTRP POSTPROCES: CPT | Mod: LT

## 2023-06-12 ENCOUNTER — APPOINTMENT (OUTPATIENT)
Dept: ORTHOPEDIC SURGERY | Facility: CLINIC | Age: 43
End: 2023-06-12
Payer: COMMERCIAL

## 2023-06-12 VITALS — BODY MASS INDEX: 27.42 KG/M2 | HEIGHT: 59 IN | WEIGHT: 136 LBS

## 2023-06-12 DIAGNOSIS — M21.42 FLAT FOOT [PES PLANUS] (ACQUIRED), RIGHT FOOT: ICD-10-CM

## 2023-06-12 DIAGNOSIS — Q66.89 OTHER SPECIFIED CONGENITAL DEFORMITIES OF FEET: ICD-10-CM

## 2023-06-12 DIAGNOSIS — M21.41 FLAT FOOT [PES PLANUS] (ACQUIRED), RIGHT FOOT: ICD-10-CM

## 2023-06-12 PROCEDURE — 99214 OFFICE O/P EST MOD 30 MIN: CPT

## 2023-06-12 NOTE — DATA REVIEWED
[CT Scan] : CT scan [Left] : left [Ankle] : ankle [Report was reviewed and noted in the chart] : The report was reviewed and noted in the chart [I independently reviewed and interpreted images and report] : I independently reviewed and interpreted images and report [FreeTextEntry1] : 1. 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 Mild plantar calcaneal bone spurs and small type 1 accessory navicular bone.\par 3. Nonspecific mild subcutaneous swelling ventral to the ankle superiorly.\par 4. Small bony islands in the medial cuneiform of no clinical significance.

## 2023-06-12 NOTE — HISTORY OF PRESENT ILLNESS
[Gradual] : gradual [5] : 5 [0] : 0 [Dull/Aching] : dull/aching [Occasional] : occasional [Standing] : standing [Walking] : walking [de-identified] : Patient presents today for follow up of both feet, LT worse than RT. Denies trauma. Symptoms x years. WB without assistive device.  She has tried nsaids, icing  and home exercises without improvement. She did not get custom orthotics. She went for CT study since last visit.  [] : no [FreeTextEntry1] : R/L foot

## 2023-06-12 NOTE — PHYSICAL EXAM
[Left] : left foot and ankle [Right] : right foot and ankle [NL (40)] : plantar flexion 40 degrees [5___] : eversion 5[unfilled]/5 [2+] : posterior tibialis pulse: 2+ [Normal] : saphenous nerve sensation normal [NL (20)] : eversion 20 degrees [4___] : inversion 4[unfilled]/5 [NL 30)] : inversion 30 degrees [] : non-antalgic [FreeTextEntry3] : Pes planus, LT worse than RT. There is hind foot valgus on the LT compared to the RT.  [FreeTextEntry8] : ttp medial subtalar joint  [TWNoteComboBox7] : dorsiflexion 10 degrees [de-identified] : inversion 20 degrees

## 2023-06-12 NOTE — ASSESSMENT
[FreeTextEntry1] : CT report and films reviewed with patient.\par \par WBAT in supportive footwear.\par Ice to affected area, NSAIDS prn.\par \par Again, recommend custom orthotics.\par \par \par \par

## 2023-09-28 ENCOUNTER — APPOINTMENT (OUTPATIENT)
Dept: INTERNAL MEDICINE | Facility: CLINIC | Age: 43
End: 2023-09-28

## 2023-10-16 ENCOUNTER — APPOINTMENT (OUTPATIENT)
Dept: ENDOCRINOLOGY | Facility: CLINIC | Age: 43
End: 2023-10-16
Payer: COMMERCIAL

## 2023-10-16 ENCOUNTER — LABORATORY RESULT (OUTPATIENT)
Age: 43
End: 2023-10-16

## 2023-10-16 ENCOUNTER — APPOINTMENT (OUTPATIENT)
Dept: CARDIOLOGY | Facility: CLINIC | Age: 43
End: 2023-10-16
Payer: COMMERCIAL

## 2023-10-16 VITALS
HEIGHT: 59 IN | SYSTOLIC BLOOD PRESSURE: 110 MMHG | DIASTOLIC BLOOD PRESSURE: 80 MMHG | TEMPERATURE: 98.3 F | WEIGHT: 135 LBS | OXYGEN SATURATION: 97 % | BODY MASS INDEX: 27.21 KG/M2 | HEART RATE: 95 BPM

## 2023-10-16 VITALS
RESPIRATION RATE: 18 BRPM | HEIGHT: 59 IN | SYSTOLIC BLOOD PRESSURE: 110 MMHG | WEIGHT: 135 LBS | TEMPERATURE: 98.3 F | HEART RATE: 95 BPM | DIASTOLIC BLOOD PRESSURE: 80 MMHG | BODY MASS INDEX: 27.21 KG/M2 | OXYGEN SATURATION: 97 %

## 2023-10-16 DIAGNOSIS — R53.83 OTHER FATIGUE: ICD-10-CM

## 2023-10-16 DIAGNOSIS — Z71.85 ENCOUNTER FOR IMMUNIZATION SAFETY COUNSELING: ICD-10-CM

## 2023-10-16 DIAGNOSIS — E04.9 NONTOXIC GOITER, UNSPECIFIED: ICD-10-CM

## 2023-10-16 DIAGNOSIS — E55.9 VITAMIN D DEFICIENCY, UNSPECIFIED: ICD-10-CM

## 2023-10-16 DIAGNOSIS — R00.2 PALPITATIONS: ICD-10-CM

## 2023-10-16 DIAGNOSIS — E04.1 NONTOXIC SINGLE THYROID NODULE: ICD-10-CM

## 2023-10-16 DIAGNOSIS — R06.09 OTHER FORMS OF DYSPNEA: ICD-10-CM

## 2023-10-16 DIAGNOSIS — E78.00 PURE HYPERCHOLESTEROLEMIA, UNSPECIFIED: ICD-10-CM

## 2023-10-16 DIAGNOSIS — Z13.228 ENCOUNTER FOR SCREENING FOR OTHER METABOLIC DISORDERS: ICD-10-CM

## 2023-10-16 PROCEDURE — 93306 TTE W/DOPPLER COMPLETE: CPT

## 2023-10-16 PROCEDURE — 93000 ELECTROCARDIOGRAM COMPLETE: CPT

## 2023-10-16 PROCEDURE — 99214 OFFICE O/P EST MOD 30 MIN: CPT | Mod: 25

## 2023-10-16 PROCEDURE — 99214 OFFICE O/P EST MOD 30 MIN: CPT

## 2023-10-18 PROCEDURE — 93224 XTRNL ECG REC UP TO 48 HRS: CPT

## 2023-10-20 LAB
ALBUMIN SERPL ELPH-MCNC: 4.2 G/DL
ALP BLD-CCNC: 58 U/L
ALT SERPL-CCNC: 23 U/L
ANION GAP SERPL CALC-SCNC: 14 MMOL/L
AST SERPL-CCNC: 21 U/L
BASOPHILS # BLD AUTO: 0.01 K/UL
BASOPHILS NFR BLD AUTO: 0.2 %
BILIRUB SERPL-MCNC: 0.4 MG/DL
BUN SERPL-MCNC: 14 MG/DL
CALCIUM SERPL-MCNC: 10 MG/DL
CHLORIDE SERPL-SCNC: 101 MMOL/L
CO2 SERPL-SCNC: 24 MMOL/L
CREAT SERPL-MCNC: 0.56 MG/DL
CRP SERPL-MCNC: 4 MG/L
EGFR: 115 ML/MIN/1.73M2
EOSINOPHIL # BLD AUTO: 0.03 K/UL
EOSINOPHIL NFR BLD AUTO: 0.5 %
ERYTHROCYTE [SEDIMENTATION RATE] IN BLOOD BY WESTERGREN METHOD: 17 MM/HR
GLUCOSE SERPL-MCNC: 72 MG/DL
HCT VFR BLD CALC: 42.4 %
HGB BLD-MCNC: 13.6 G/DL
IMM GRANULOCYTES NFR BLD AUTO: 0.2 %
LYMPHOCYTES # BLD AUTO: 1.71 K/UL
LYMPHOCYTES NFR BLD AUTO: 26.8 %
MAN DIFF?: NORMAL
MCHC RBC-ENTMCNC: 27.5 PG
MCHC RBC-ENTMCNC: 32.1 GM/DL
MCV RBC AUTO: 85.7 FL
MONOCYTES # BLD AUTO: 0.57 K/UL
MONOCYTES NFR BLD AUTO: 8.9 %
NEUTROPHILS # BLD AUTO: 4.06 K/UL
NEUTROPHILS NFR BLD AUTO: 63.4 %
PLATELET # BLD AUTO: 273 K/UL
POTASSIUM SERPL-SCNC: 4.2 MMOL/L
PROT SERPL-MCNC: 7.1 G/DL
RBC # BLD: 4.95 M/UL
RBC # FLD: 13.2 %
SODIUM SERPL-SCNC: 139 MMOL/L
T3FREE SERPL-MCNC: 6.86 PG/ML
T4 FREE SERPL-MCNC: 2 NG/DL
TSH RECEPTOR AB: 3.72 IU/L
TSH SERPL-ACNC: <0.01 UIU/ML
TSI ACT/NOR SER: 3.69 IU/L
WBC # FLD AUTO: 6.39 K/UL

## 2023-10-23 PROBLEM — E78.00 ELEVATED CHOLESTEROL: Status: ACTIVE | Noted: 2023-10-23

## 2023-10-23 LAB
25(OH)D3 SERPL-MCNC: 29.4 NG/ML
APPEARANCE: CLEAR
BACTERIA: NEGATIVE /HPF
BILIRUBIN URINE: NEGATIVE
BLOOD URINE: NEGATIVE
CAST: 0 /LPF
CHOLEST SERPL-MCNC: 235 MG/DL
COLOR: YELLOW
EPITHELIAL CELLS: 3 /HPF
ESTIMATED AVERAGE GLUCOSE: 103 MG/DL
FERRITIN SERPL-MCNC: 16 NG/ML
GLUCOSE QUALITATIVE U: NEGATIVE MG/DL
HBA1C MFR BLD HPLC: 5.2 %
HCG SERPL-MCNC: <1 MIU/ML
HDLC SERPL-MCNC: 73 MG/DL
IRON SATN MFR SERPL: 34 %
IRON SERPL-MCNC: 120 UG/DL
KETONES URINE: NEGATIVE MG/DL
LDLC SERPL CALC-MCNC: 145 MG/DL
LEUKOCYTE ESTERASE URINE: NEGATIVE
MAGNESIUM SERPL-MCNC: 1.9 MG/DL
MICROSCOPIC-UA: NORMAL
NITRITE URINE: NEGATIVE
NONHDLC SERPL-MCNC: 162 MG/DL
PH URINE: 7.5
PROTEIN URINE: NEGATIVE MG/DL
RED BLOOD CELLS URINE: 1 /HPF
SPECIFIC GRAVITY URINE: 1.02
TIBC SERPL-MCNC: 351 UG/DL
TRIGL SERPL-MCNC: 100 MG/DL
UIBC SERPL-MCNC: 231 UG/DL
UROBILINOGEN URINE: 0.2 MG/DL
WHITE BLOOD CELLS URINE: 0 /HPF

## 2023-11-01 RX ORDER — METOPROLOL TARTRATE 25 MG/1
25 TABLET, FILM COATED ORAL TWICE DAILY
Qty: 60 | Refills: 1 | Status: ACTIVE | COMMUNITY
Start: 2023-10-16 | End: 1900-01-01

## 2023-11-27 ENCOUNTER — INPATIENT (INPATIENT)
Facility: HOSPITAL | Age: 43
LOS: 3 days | Discharge: HOME | DRG: 419 | End: 2023-12-01
Attending: SURGERY | Admitting: SURGERY
Payer: COMMERCIAL

## 2023-11-27 VITALS
WEIGHT: 134.92 LBS | SYSTOLIC BLOOD PRESSURE: 106 MMHG | OXYGEN SATURATION: 96 % | RESPIRATION RATE: 16 BRPM | DIASTOLIC BLOOD PRESSURE: 68 MMHG | HEART RATE: 73 BPM | HEIGHT: 59 IN | TEMPERATURE: 98 F

## 2023-11-27 LAB
ALBUMIN SERPL ELPH-MCNC: 4.4 G/DL — SIGNIFICANT CHANGE UP (ref 3.3–5)
ALBUMIN SERPL ELPH-MCNC: 4.4 G/DL — SIGNIFICANT CHANGE UP (ref 3.3–5)
ALP SERPL-CCNC: 105 U/L — SIGNIFICANT CHANGE UP (ref 40–120)
ALP SERPL-CCNC: 105 U/L — SIGNIFICANT CHANGE UP (ref 40–120)
ALT FLD-CCNC: 181 U/L — HIGH (ref 10–45)
ALT FLD-CCNC: 181 U/L — HIGH (ref 10–45)
ANION GAP SERPL CALC-SCNC: 12 MMOL/L — SIGNIFICANT CHANGE UP (ref 5–17)
ANION GAP SERPL CALC-SCNC: 12 MMOL/L — SIGNIFICANT CHANGE UP (ref 5–17)
AST SERPL-CCNC: 93 U/L — HIGH (ref 10–40)
AST SERPL-CCNC: 93 U/L — HIGH (ref 10–40)
BASOPHILS # BLD AUTO: 0.01 K/UL — SIGNIFICANT CHANGE UP (ref 0–0.2)
BASOPHILS # BLD AUTO: 0.01 K/UL — SIGNIFICANT CHANGE UP (ref 0–0.2)
BASOPHILS NFR BLD AUTO: 0.2 % — SIGNIFICANT CHANGE UP (ref 0–2)
BASOPHILS NFR BLD AUTO: 0.2 % — SIGNIFICANT CHANGE UP (ref 0–2)
BILIRUB SERPL-MCNC: 0.4 MG/DL — SIGNIFICANT CHANGE UP (ref 0.2–1.2)
BILIRUB SERPL-MCNC: 0.4 MG/DL — SIGNIFICANT CHANGE UP (ref 0.2–1.2)
BUN SERPL-MCNC: 8 MG/DL — SIGNIFICANT CHANGE UP (ref 7–23)
BUN SERPL-MCNC: 8 MG/DL — SIGNIFICANT CHANGE UP (ref 7–23)
CALCIUM SERPL-MCNC: 9.5 MG/DL — SIGNIFICANT CHANGE UP (ref 8.4–10.5)
CALCIUM SERPL-MCNC: 9.5 MG/DL — SIGNIFICANT CHANGE UP (ref 8.4–10.5)
CHLORIDE SERPL-SCNC: 102 MMOL/L — SIGNIFICANT CHANGE UP (ref 96–108)
CHLORIDE SERPL-SCNC: 102 MMOL/L — SIGNIFICANT CHANGE UP (ref 96–108)
CO2 SERPL-SCNC: 27 MMOL/L — SIGNIFICANT CHANGE UP (ref 22–31)
CO2 SERPL-SCNC: 27 MMOL/L — SIGNIFICANT CHANGE UP (ref 22–31)
CREAT SERPL-MCNC: 0.66 MG/DL — SIGNIFICANT CHANGE UP (ref 0.5–1.3)
CREAT SERPL-MCNC: 0.66 MG/DL — SIGNIFICANT CHANGE UP (ref 0.5–1.3)
EGFR: 110 ML/MIN/1.73M2 — SIGNIFICANT CHANGE UP
EGFR: 110 ML/MIN/1.73M2 — SIGNIFICANT CHANGE UP
EOSINOPHIL # BLD AUTO: 0.09 K/UL — SIGNIFICANT CHANGE UP (ref 0–0.5)
EOSINOPHIL # BLD AUTO: 0.09 K/UL — SIGNIFICANT CHANGE UP (ref 0–0.5)
EOSINOPHIL NFR BLD AUTO: 1.4 % — SIGNIFICANT CHANGE UP (ref 0–6)
EOSINOPHIL NFR BLD AUTO: 1.4 % — SIGNIFICANT CHANGE UP (ref 0–6)
GLUCOSE SERPL-MCNC: 89 MG/DL — SIGNIFICANT CHANGE UP (ref 70–99)
GLUCOSE SERPL-MCNC: 89 MG/DL — SIGNIFICANT CHANGE UP (ref 70–99)
HCG SERPL-ACNC: <2 MIU/ML — SIGNIFICANT CHANGE UP
HCG SERPL-ACNC: <2 MIU/ML — SIGNIFICANT CHANGE UP
HCT VFR BLD CALC: 40.2 % — SIGNIFICANT CHANGE UP (ref 34.5–45)
HCT VFR BLD CALC: 40.2 % — SIGNIFICANT CHANGE UP (ref 34.5–45)
HGB BLD-MCNC: 13.1 G/DL — SIGNIFICANT CHANGE UP (ref 11.5–15.5)
HGB BLD-MCNC: 13.1 G/DL — SIGNIFICANT CHANGE UP (ref 11.5–15.5)
IMM GRANULOCYTES NFR BLD AUTO: 0.3 % — SIGNIFICANT CHANGE UP (ref 0–0.9)
IMM GRANULOCYTES NFR BLD AUTO: 0.3 % — SIGNIFICANT CHANGE UP (ref 0–0.9)
LIDOCAIN IGE QN: 21 U/L — SIGNIFICANT CHANGE UP (ref 7–60)
LIDOCAIN IGE QN: 21 U/L — SIGNIFICANT CHANGE UP (ref 7–60)
LYMPHOCYTES # BLD AUTO: 0.87 K/UL — LOW (ref 1–3.3)
LYMPHOCYTES # BLD AUTO: 0.87 K/UL — LOW (ref 1–3.3)
LYMPHOCYTES # BLD AUTO: 13.5 % — SIGNIFICANT CHANGE UP (ref 13–44)
LYMPHOCYTES # BLD AUTO: 13.5 % — SIGNIFICANT CHANGE UP (ref 13–44)
MCHC RBC-ENTMCNC: 26.8 PG — LOW (ref 27–34)
MCHC RBC-ENTMCNC: 26.8 PG — LOW (ref 27–34)
MCHC RBC-ENTMCNC: 32.6 GM/DL — SIGNIFICANT CHANGE UP (ref 32–36)
MCHC RBC-ENTMCNC: 32.6 GM/DL — SIGNIFICANT CHANGE UP (ref 32–36)
MCV RBC AUTO: 82.4 FL — SIGNIFICANT CHANGE UP (ref 80–100)
MCV RBC AUTO: 82.4 FL — SIGNIFICANT CHANGE UP (ref 80–100)
MONOCYTES # BLD AUTO: 0.4 K/UL — SIGNIFICANT CHANGE UP (ref 0–0.9)
MONOCYTES # BLD AUTO: 0.4 K/UL — SIGNIFICANT CHANGE UP (ref 0–0.9)
MONOCYTES NFR BLD AUTO: 6.2 % — SIGNIFICANT CHANGE UP (ref 2–14)
MONOCYTES NFR BLD AUTO: 6.2 % — SIGNIFICANT CHANGE UP (ref 2–14)
NEUTROPHILS # BLD AUTO: 5.05 K/UL — SIGNIFICANT CHANGE UP (ref 1.8–7.4)
NEUTROPHILS # BLD AUTO: 5.05 K/UL — SIGNIFICANT CHANGE UP (ref 1.8–7.4)
NEUTROPHILS NFR BLD AUTO: 78.4 % — HIGH (ref 43–77)
NEUTROPHILS NFR BLD AUTO: 78.4 % — HIGH (ref 43–77)
NRBC # BLD: 0 /100 WBCS — SIGNIFICANT CHANGE UP (ref 0–0)
NRBC # BLD: 0 /100 WBCS — SIGNIFICANT CHANGE UP (ref 0–0)
PLATELET # BLD AUTO: 294 K/UL — SIGNIFICANT CHANGE UP (ref 150–400)
PLATELET # BLD AUTO: 294 K/UL — SIGNIFICANT CHANGE UP (ref 150–400)
POTASSIUM SERPL-MCNC: 4.1 MMOL/L — SIGNIFICANT CHANGE UP (ref 3.5–5.3)
POTASSIUM SERPL-MCNC: 4.1 MMOL/L — SIGNIFICANT CHANGE UP (ref 3.5–5.3)
POTASSIUM SERPL-SCNC: 4.1 MMOL/L — SIGNIFICANT CHANGE UP (ref 3.5–5.3)
POTASSIUM SERPL-SCNC: 4.1 MMOL/L — SIGNIFICANT CHANGE UP (ref 3.5–5.3)
PROT SERPL-MCNC: 7.3 G/DL — SIGNIFICANT CHANGE UP (ref 6–8.3)
PROT SERPL-MCNC: 7.3 G/DL — SIGNIFICANT CHANGE UP (ref 6–8.3)
RBC # BLD: 4.88 M/UL — SIGNIFICANT CHANGE UP (ref 3.8–5.2)
RBC # BLD: 4.88 M/UL — SIGNIFICANT CHANGE UP (ref 3.8–5.2)
RBC # FLD: 13.2 % — SIGNIFICANT CHANGE UP (ref 10.3–14.5)
RBC # FLD: 13.2 % — SIGNIFICANT CHANGE UP (ref 10.3–14.5)
SODIUM SERPL-SCNC: 141 MMOL/L — SIGNIFICANT CHANGE UP (ref 135–145)
SODIUM SERPL-SCNC: 141 MMOL/L — SIGNIFICANT CHANGE UP (ref 135–145)
WBC # BLD: 6.44 K/UL — SIGNIFICANT CHANGE UP (ref 3.8–10.5)
WBC # BLD: 6.44 K/UL — SIGNIFICANT CHANGE UP (ref 3.8–10.5)
WBC # FLD AUTO: 6.44 K/UL — SIGNIFICANT CHANGE UP (ref 3.8–10.5)
WBC # FLD AUTO: 6.44 K/UL — SIGNIFICANT CHANGE UP (ref 3.8–10.5)

## 2023-11-27 PROCEDURE — 99223 1ST HOSP IP/OBS HIGH 75: CPT

## 2023-11-27 RX ORDER — FAMOTIDINE 10 MG/ML
20 INJECTION INTRAVENOUS ONCE
Refills: 0 | Status: COMPLETED | OUTPATIENT
Start: 2023-11-27 | End: 2023-11-27

## 2023-11-27 RX ORDER — SODIUM CHLORIDE 9 MG/ML
1000 INJECTION, SOLUTION INTRAVENOUS
Refills: 0 | Status: DISCONTINUED | OUTPATIENT
Start: 2023-11-27 | End: 2023-11-28

## 2023-11-27 RX ORDER — FAMOTIDINE 10 MG/ML
20 INJECTION INTRAVENOUS DAILY
Refills: 0 | Status: DISCONTINUED | OUTPATIENT
Start: 2023-11-27 | End: 2023-11-27

## 2023-11-27 RX ORDER — ACETAMINOPHEN 500 MG
1000 TABLET ORAL ONCE
Refills: 0 | Status: COMPLETED | OUTPATIENT
Start: 2023-11-27 | End: 2023-11-27

## 2023-11-27 RX ORDER — SODIUM CHLORIDE 9 MG/ML
1000 INJECTION INTRAMUSCULAR; INTRAVENOUS; SUBCUTANEOUS ONCE
Refills: 0 | Status: COMPLETED | OUTPATIENT
Start: 2023-11-27 | End: 2023-11-27

## 2023-11-27 RX ORDER — METOPROLOL TARTRATE 50 MG
12.5 TABLET ORAL
Refills: 0 | Status: DISCONTINUED | OUTPATIENT
Start: 2023-11-27 | End: 2023-11-29

## 2023-11-27 RX ADMIN — Medication 400 MILLIGRAM(S): at 21:54

## 2023-11-27 RX ADMIN — SODIUM CHLORIDE 1000 MILLILITER(S): 9 INJECTION INTRAMUSCULAR; INTRAVENOUS; SUBCUTANEOUS at 12:47

## 2023-11-27 RX ADMIN — FAMOTIDINE 20 MILLIGRAM(S): 10 INJECTION INTRAVENOUS at 12:58

## 2023-11-27 RX ADMIN — Medication 20 MILLILITER(S): at 12:47

## 2023-11-27 NOTE — ED ADULT TRIAGE NOTE - GLASGOW COMA SCALE: BEST VERBAL RESPONSE, MLM
New  · Exacerbated with exertion but does not happen every time  · Differential include GERD vs ischemia  · Asymptomatic today  · Cardiac exam is unremarkable  · Follow up stress test  · Discussed ed precautions (V5) oriented

## 2023-11-27 NOTE — ED ADULT NURSE NOTE - OBJECTIVE STATEMENT
42 yo F with PMH of Graves disease presents due to intermittent epigastric pain for past 3 days. Pain is worse after eating. She has had 2 episodes of non-bloody non-bilious emesis. Last episode was this morning; pt states she was feeling better so attempted to eat, but then felt epigastric pain and vomited. No diarrhea. Has been having normal soft stools. Pt is well appearing and in no acute distress.

## 2023-11-27 NOTE — CONSULT NOTE ADULT - ASSESSMENT
44 yo F with PMHx of Graves disease with no prior surgical history presents to the ED due to intermittent epigastric pain for the past 3 days. U/S and CT imaging with cholelithiasis. Surgery consulted to evaluate. Overall picture equivocal for acute cholecystitis. Recommend HIDA scan.     Plan:   - No acute surgical intervention  - HIDA  - CDU  - Keep NPO, IV fluids  - Surgery to follow HIDA 44 yo F with PMHx of Graves disease with no prior surgical history presents to the ED due to intermittent epigastric pain for the past 3 days. U/S and CT imaging with cholelithiasis. Surgery consulted to evaluate. Overall picture equivocal for acute cholecystitis. Recommend HIDA scan.     Plan:   - No acute surgical intervention  - HIDA  - CDU  - Keep NPO, IV fluids  - Surgery to follow HIDA    Discussed with Attending Surgeon Dr. Badillo on behalf of Dr. Marcio Parker, [id number]   Rene Christianson MD PGY3

## 2023-11-27 NOTE — ED CDU PROVIDER INITIAL DAY NOTE - CLINICAL SUMMARY MEDICAL DECISION MAKING FREE TEXT BOX
Adult female c/o pain ruq concerns for Biliary dz plan cdu for disida scan final surg recs  Sean Dietz MD, Facep

## 2023-11-27 NOTE — ED PROVIDER NOTE - PROGRESS NOTE DETAILS
Mild elevation in AST/ALT. Will obtain gallbladder US and CT abd/pelvis. - Loreto Erazo MD, PEM Fellow Marino Pickett DO (PGY1)  Consulted Surgery, told to call back in 30 min because they're busy. Marino Pickett DO (PGY1)  Consulted surgery, will see pt. Marino Pickett DO (PGY1)  Surgery wants pt to get HIDA in AM and stay over night in observation. Marino Pickett DO (PGY1)  Surgery wants pt to get HIDA in AM and stay over night in observation. VSS. Time was taken to answer all of patients questions and concerns. Received signout Dr Flores  Adult female pw 3d h of postprandially abd pain epigastric, CT/sono cf biliary dz. Seen by surg; req CDU for disida scan  Sean Dietz MD, Facep

## 2023-11-27 NOTE — ED PROVIDER NOTE - PHYSICAL EXAMINATION
Const:  Alert and interactive, no acute distress  HEENT: Normocephalic, atraumatic; Moist mucosa; Oropharynx clear  CV: Heart regular rate and rhythm, normal S1/2, no murmurs; Extremities [id number]  Pulm: Lungs clear to auscultation bilaterally, no crackles, no wheezing. No increased work of breathing  GI: Abdomen soft, non-distended; +epigastric tenderness to palpation. No tenderness to palpation of other quadrants.  Skin: No rash noted  Neuro: Alert; Normal tone

## 2023-11-27 NOTE — ED CDU PROVIDER INITIAL DAY NOTE - DETAILS
44 yo F with Graves disease p/w abdominal pain r/o acute cholecystitis  Plan: frequent reeval, vitals q 4hrs, serial abdominal exams, HIDA scan, pain control

## 2023-11-27 NOTE — CONSULT NOTE ADULT - SUBJECTIVE AND OBJECTIVE BOX
SURGERY CONSULT NOTE  --------------------------------------------------------------------------------------------    HPI:       PAST MEDICAL & SURGICAL HISTORY:  Graves disease      No significant past surgical history        FAMILY HISTORY:    [] Family history not pertinent as reviewed with the patient and family    ALLERGIES: sulfa drugs (Rash)      CURRENT MEDICATIONS  MEDICATIONS (STANDING):   MEDICATIONS (PRN):  --------------------------------------------------------------------------------------------    Vitals:   T(C): 36.7 (11-27-23 @ 10:15), Max: 36.7 (11-27-23 @ 10:15)  HR: 75 (11-27-23 @ 18:51) (73 - 75)  BP: 113/70 (11-27-23 @ 18:51) (106/68 - 113/70)  RR: 18 (11-27-23 @ 18:51) (16 - 18)  SpO2: 100% (11-27-23 @ 18:51) (96% - 100%)  CAPILLARY BLOOD GLUCOSE        CAPILLARY BLOOD GLUCOSE          Height (cm): 149.9 (11-27 @ 10:15)  Weight (kg): 61.2 (11-27 @ 10:15)  BMI (kg/m2): 27.2 (11-27 @ 10:15)  BSA (m2): 1.56 (11-27 @ 10:15)    PHYSICAL EXAM:   General: Alert, NAD  Neuro: A+Ox3  HEENT: NC/AT, no asymmetry, no scleral icterus  Neck: Soft, supple  Cardio: RRR, nml S1/S2  Resp: Airway patent, unlabored breathing  Thorax: No chest wall tenderness  GI/Abd: Soft, NT/ND, no rebound/guarding, no masses palpated  Vascular: All 4 extremities warm, B/l radial pulses palpable, b/l femoral pulse palpable,  b/l DP/PT palpable  Skin: Intact, no breakdown  Musculoskeletal: All 4 extremities moving spontaneously, no limitations  --------------------------------------------------------------------------------------------    LABS  CBC (11-27 @ 13:06)                              13.1                           6.44    )----------------(  294        78.4<H>% Neutrophils, 13.5  % Lymphocytes, ANC: 5.05                                40.2      BMP (11-27 @ 13:06)             141     |  102     |  8     		Ca++ --      Ca 9.5                ---------------------------------( 89    		Mg --                 4.1     |  27      |  0.66  			Ph --        LFTs (11-27 @ 13:06)      TPro 7.3 / Alb 4.4 / TBili 0.4 / DBili -- / AST 93<H> / <H> / AlkPhos 105            --------------------------------------------------------------------------------------------    MICROBIOLOGY  Urinalysis (11-27 @ 13:06):     Color:  / Appearance:  / SG:  / pH:  / Gluc: 89 / Ketones:  / Bili:  / Urobili:  / Protein : / Nitrites:  / Leuk.Est:  / RBC:  / WBC:  / Sq Epi:  / Non Sq Epi:  / Bacteria          --------------------------------------------------------------------------------------------    IMAGING   SURGERY CONSULT NOTE  --------------------------------------------------------------------------------------------    HPI: 42 yo F with PMHx of Graves disease with no prior surgical history presents to the ED due to intermittent epigastric pain for the past 3 days. U/S and CT imaging with cholelithiasis. Surgery consulted to evaluate.   Patient is HDS, afebrile, labs without leukocytosis, and mild elevation in LFTs.    Pain is worse after eating. She has had 2 episodes of non-bloody non-bilious emesis. Last episode was this morning; pt states she was feeling better so she attempted to eat, but then felt epigastric pain and vomited. Denies diarrhea. Has been having normal soft stools. This has never happened before. Since coming to the ED, pain decreased from 8/10 to 1/10. This evening she denies fever, chest pain, nausea, vomiting, diarrhea.      PAST MEDICAL & SURGICAL HISTORY:  Graves disease      No significant past surgical history        FAMILY HISTORY:    [] Family history not pertinent as reviewed with the patient and family    ALLERGIES: sulfa drugs (Rash)      CURRENT MEDICATIONS  MEDICATIONS (STANDING):   MEDICATIONS (PRN):  --------------------------------------------------------------------------------------------    Vitals:   T(C): 36.7 (11-27-23 @ 10:15), Max: 36.7 (11-27-23 @ 10:15)  HR: 75 (11-27-23 @ 18:51) (73 - 75)  BP: 113/70 (11-27-23 @ 18:51) (106/68 - 113/70)  RR: 18 (11-27-23 @ 18:51) (16 - 18)  SpO2: 100% (11-27-23 @ 18:51) (96% - 100%)  CAPILLARY BLOOD GLUCOSE        CAPILLARY BLOOD GLUCOSE          Height (cm): 149.9 (11-27 @ 10:15)  Weight (kg): 61.2 (11-27 @ 10:15)  BMI (kg/m2): 27.2 (11-27 @ 10:15)  BSA (m2): 1.56 (11-27 @ 10:15)    PHYSICAL EXAM:   General: Alert, NAD  Neuro: A+Ox3  HEENT: NC/AT, no asymmetry, no scleral icterus  Neck: Soft, supple  Cardio: RRR  Resp: Airway patent, unlabored breathing  Thorax: No chest wall tenderness  GI/Abd: +Mild epigastric tenderness to palpation, soft, NT/ND, no rebound/guarding, no masses palpated, negative Trinidad's sign  Vascular: All 4 extremities warm  Skin: Intact, no breakdown  Musculoskeletal: All 4 extremities moving spontaneously, no limitations  --------------------------------------------------------------------------------------------    LABS  CBC (11-27 @ 13:06)                              13.1                           6.44    )----------------(  294        78.4<H>% Neutrophils, 13.5  % Lymphocytes, ANC: 5.05                                40.2      BMP (11-27 @ 13:06)             141     |  102     |  8     		Ca++ --      Ca 9.5                ---------------------------------( 89    		Mg --                 4.1     |  27      |  0.66  			Ph --        LFTs (11-27 @ 13:06)      TPro 7.3 / Alb 4.4 / TBili 0.4 / DBili -- / AST 93<H> / <H> / AlkPhos 105            --------------------------------------------------------------------------------------------    MICROBIOLOGY  Urinalysis (11-27 @ 13:06):     Color:  / Appearance:  / SG:  / pH:  / Gluc: 89 / Ketones:  / Bili:  / Urobili:  / Protein : / Nitrites:  / Leuk.Est:  / RBC:  / WBC:  / Sq Epi:  / Non Sq Epi:  / Bacteria          --------------------------------------------------------------------------------------------    IMAGING

## 2023-11-27 NOTE — ED PROVIDER NOTE - DISPOSITION TYPE
Problem: Health Behavior:  Goal: Ability to verbalize adaptive coping strategies to use when the urge to self-mutilate occurs will improve  Ability to verbalize adaptive coping strategies to use when the urge to self-mutilate occurs will improve   Outcome: Ongoing  Patient is attending therapeutic groups to gain insight on positive coping and has not shown any signs of self-mutilate. Problem: Altered Mood, Depressive Behavior  Goal: LTG-Able to verbalize acceptance of life and situations over which he or she has no control  Outcome: Ongoing  Patient wanted to be discharged but the doctor didn't want him discharged and patient was very accepting of the doctors decision and has been compliant with the program this shift. Goal: LTG-Able to verbalize and/or display a decrease in depressive symptoms  Outcome: Ongoing  Patient is bright and has good eye contact. He has a positive attitude and interacts well with peers. He states he is hopeful for his future and rates his mood 8 on scale of 1-10 with 10 happiest.   Goal: LTG-Absence of self-harm  Outcome: Ongoing  Patient maintained in a safe and secure environment with no injuries to self or others. Patient denies suicidal ideations at this time. Goal: STG-Knowledge of positive coping patterns  Outcome: Ongoing  Patient is attending all therapeutic groups to gain insight on mental illness and learn positive coping skills. Goal: Patient Specific Goal  Outcome: Ongoing  Patient goal is \"to get home and do a lot. \"   Goal: Participation in care planning  Outcome: Ongoing  Care plan reviewed with patient. Patient verbalized understanding of the plan of care and contributed to goal setting. Problem: Falls - Risk of:  Goal: Will remain free from falls  Will remain free from falls   Outcome: Ongoing  Steady gait no falls this shift.      Problem: Discharge Planning:  Goal: Discharged to appropriate level of care  Discharged to appropriate level of care Outcome: Ongoing  Discharge planner working with patient to achieve optimal discharge plan, specific to the needs of this patient. Problem: Substance Abuse  Goal: STG-Knowledge of positive coping patterns  Outcome: Ongoing  Patient is attending all therapeutic groups to gain insight on mental illness and learn positive coping skills. Comments:   Care plan reviewed with patient. Patient verbalized understanding of the plan of care and contributed to goal setting. OBSERVATION
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
## 2023-11-27 NOTE — ED PROVIDER NOTE - NSTIMEPROVIDERCAREINITIATE_GEN_ER
27-Nov-2023 11:55 Winlevi Counseling:  I discussed with the patient the risks of topical clascoterone including but not limited to erythema, scaling, itching, and stinging. Patient voiced their understanding.

## 2023-11-27 NOTE — ED PROVIDER NOTE - OBJECTIVE STATEMENT
42 yo F with Graves disease presents due to intermittent epigastric pain for past 3 days. Pain is worse after eating. She has had 2 episodes of non-bloody non-bilious emesis. Last episode was this morning; pt states she was feeling better so attempted to eat, but then felt epigastric pain and vomited. No diarrhea. Has been having normal soft stools. This has never happened before.   PMHx - Graves disease  PSHx - none  Meds - lopressor, teppezza  All - sulfa drugs (rash)

## 2023-11-27 NOTE — ED CDU PROVIDER INITIAL DAY NOTE - PHYSICAL EXAMINATION
Const:  Alert and interactive, no acute distress  HEENT: Normocephalic, atraumatic; Moist mucosa; Oropharynx clear  CV: Heart regular rate and rhythm, normal S1/2, no murmurs; Extremities WWPx4  Pulm: Lungs clear to auscultation bilaterally, no crackles, no wheezing. No increased work of breathing  GI: Abdomen soft, non-distended; +epigastric tenderness to palpation. No tenderness to palpation of other quadrants. No rebound or guarding.  Skin: No rash noted  Neuro: Alert; Normal tone

## 2023-11-27 NOTE — ED PROVIDER NOTE - CLINICAL SUMMARY MEDICAL DECISION MAKING FREE TEXT BOX
44 yo F with h/o Graves disease presents due to epigastric pain and NBNB emesis worse after eating for past 3 days, with epigastric tenderness to palpation. Will obtain CBC, CMP, lipase, and give NS bolus, maalox, and pepcid. Ddx includes peptic ulcer disease, reflux, pancreatitis, viral gastroenteritis. - Loreto Erazo MD, PEM Fellow

## 2023-11-27 NOTE — ED CDU PROVIDER INITIAL DAY NOTE - NS ED ATTENDING STATEMENT MOD
no This was a shared visit with the SUSAN. I reviewed and verified the documentation and independently performed the documented:

## 2023-11-27 NOTE — ED CDU PROVIDER INITIAL DAY NOTE - OBJECTIVE STATEMENT
44 yo F with Graves disease presents due to intermittent epigastric pain for past 3 days. Pain is worse after eating. She has had 2 episodes of non-bloody non-bilious emesis. Last episode was this morning; pt states she was feeling better so attempted to eat, but then felt epigastric pain and vomited. No diarrhea. Has been having normal soft stools. This has never happened before.  In ED, patient had laboratory significant for mild elevations in AST/ALT and CT abd/pel showing A punctate hyperdense focus in the dependent gallbladder   likely reflects cholelithiasis. No appreciable wall thickening or surrounding inflammatory changes. US abdomen No sonographic evidence of acute cholecystitis. Pt was evaluated by Surgery who recommended CDU for frequent reeval, vitals q 4hrs, serial abdominal exams, HIDA scan, pain control

## 2023-11-27 NOTE — ED PROVIDER NOTE - ATTENDING CONTRIBUTION TO CARE
I performed a history and physical exam of the patient and discussed their management with the fellow. I reviewed the fellow's note and agree with the documented findings and plan of care.  Esperanza Nova MD

## 2023-11-27 NOTE — ED CDU PROVIDER DISPOSITION NOTE - ATTENDING CONTRIBUTION TO CARE
Patient notes that he she is having epigastric pain that radiates up into her chest particularly after eating.  It last for a few hours.  She does not want to eat.  Epigastric tenderness to palpation with no rebound or guarding no right upper quadrant tenderness to palpation no tenderness elsewhere no peritoneal signs.  Discussed with Dr. De Leon who stated that it is possible this is biliary colic and if the symptoms are consistent with such she be amenable to surgery.  Based on my history I think that it is more likely that this is gastritis and the patient is still quite symptomatic.  Will get a GI consultation and as the patient is having trouble eating may require admission to the hospital.

## 2023-11-27 NOTE — ED CDU PROVIDER INITIAL DAY NOTE - ATTENDING APP SHARED VISIT CONTRIBUTION OF CARE
I have personally performed a face to face diagnostic evaluation on this patient.  I have reviewed the ACP note and agree with the history, exam, and plan of care, except as noted.  History and Exam by me shows  See ED provider note  Sean Dietz MD, Facep

## 2023-11-27 NOTE — ED ADULT NURSE NOTE - PRIMARY CARE PROVIDER
- Continue torsemide 20 mg PO QD  - Continue terazosin 5 mg PO QD  - Continue amlodipine 10 mg PO QD  - Continue  spironolactone 25 mg PO QD   - Follow-up with cardiology as an outpatient   - Continue to monitor BP at facility unk

## 2023-11-28 ENCOUNTER — RESULT REVIEW (OUTPATIENT)
Age: 43
End: 2023-11-28

## 2023-11-28 ENCOUNTER — TRANSCRIPTION ENCOUNTER (OUTPATIENT)
Age: 43
End: 2023-11-28

## 2023-11-28 DIAGNOSIS — R10.11 RIGHT UPPER QUADRANT PAIN: ICD-10-CM

## 2023-11-28 DIAGNOSIS — K80.20 CALCULUS OF GALLBLADDER W/OUT CHOLECYSTITIS W/OUT OBSTRUCTION: ICD-10-CM

## 2023-11-28 LAB
ALBUMIN SERPL ELPH-MCNC: 3.9 G/DL — SIGNIFICANT CHANGE UP (ref 3.3–5)
ALBUMIN SERPL ELPH-MCNC: 3.9 G/DL — SIGNIFICANT CHANGE UP (ref 3.3–5)
ALP SERPL-CCNC: 96 U/L — SIGNIFICANT CHANGE UP (ref 40–120)
ALP SERPL-CCNC: 96 U/L — SIGNIFICANT CHANGE UP (ref 40–120)
ALT FLD-CCNC: 181 U/L — HIGH (ref 10–45)
ALT FLD-CCNC: 181 U/L — HIGH (ref 10–45)
ANION GAP SERPL CALC-SCNC: 11 MMOL/L — SIGNIFICANT CHANGE UP (ref 5–17)
ANION GAP SERPL CALC-SCNC: 11 MMOL/L — SIGNIFICANT CHANGE UP (ref 5–17)
AST SERPL-CCNC: 108 U/L — HIGH (ref 10–40)
AST SERPL-CCNC: 108 U/L — HIGH (ref 10–40)
BASOPHILS # BLD AUTO: 0.03 K/UL — SIGNIFICANT CHANGE UP (ref 0–0.2)
BASOPHILS # BLD AUTO: 0.03 K/UL — SIGNIFICANT CHANGE UP (ref 0–0.2)
BASOPHILS NFR BLD AUTO: 0.7 % — SIGNIFICANT CHANGE UP (ref 0–2)
BASOPHILS NFR BLD AUTO: 0.7 % — SIGNIFICANT CHANGE UP (ref 0–2)
BILIRUB SERPL-MCNC: 0.4 MG/DL — SIGNIFICANT CHANGE UP (ref 0.2–1.2)
BILIRUB SERPL-MCNC: 0.4 MG/DL — SIGNIFICANT CHANGE UP (ref 0.2–1.2)
BUN SERPL-MCNC: 8 MG/DL — SIGNIFICANT CHANGE UP (ref 7–23)
BUN SERPL-MCNC: 8 MG/DL — SIGNIFICANT CHANGE UP (ref 7–23)
CALCIUM SERPL-MCNC: 8.8 MG/DL — SIGNIFICANT CHANGE UP (ref 8.4–10.5)
CALCIUM SERPL-MCNC: 8.8 MG/DL — SIGNIFICANT CHANGE UP (ref 8.4–10.5)
CHLORIDE SERPL-SCNC: 106 MMOL/L — SIGNIFICANT CHANGE UP (ref 96–108)
CHLORIDE SERPL-SCNC: 106 MMOL/L — SIGNIFICANT CHANGE UP (ref 96–108)
CO2 SERPL-SCNC: 24 MMOL/L — SIGNIFICANT CHANGE UP (ref 22–31)
CO2 SERPL-SCNC: 24 MMOL/L — SIGNIFICANT CHANGE UP (ref 22–31)
CREAT SERPL-MCNC: 0.59 MG/DL — SIGNIFICANT CHANGE UP (ref 0.5–1.3)
CREAT SERPL-MCNC: 0.59 MG/DL — SIGNIFICANT CHANGE UP (ref 0.5–1.3)
EGFR: 113 ML/MIN/1.73M2 — SIGNIFICANT CHANGE UP
EGFR: 113 ML/MIN/1.73M2 — SIGNIFICANT CHANGE UP
EOSINOPHIL # BLD AUTO: 0.18 K/UL — SIGNIFICANT CHANGE UP (ref 0–0.5)
EOSINOPHIL # BLD AUTO: 0.18 K/UL — SIGNIFICANT CHANGE UP (ref 0–0.5)
EOSINOPHIL NFR BLD AUTO: 4.1 % — SIGNIFICANT CHANGE UP (ref 0–6)
EOSINOPHIL NFR BLD AUTO: 4.1 % — SIGNIFICANT CHANGE UP (ref 0–6)
GLUCOSE SERPL-MCNC: 79 MG/DL — SIGNIFICANT CHANGE UP (ref 70–99)
GLUCOSE SERPL-MCNC: 79 MG/DL — SIGNIFICANT CHANGE UP (ref 70–99)
HCT VFR BLD CALC: 36.4 % — SIGNIFICANT CHANGE UP (ref 34.5–45)
HCT VFR BLD CALC: 36.4 % — SIGNIFICANT CHANGE UP (ref 34.5–45)
HGB BLD-MCNC: 12.1 G/DL — SIGNIFICANT CHANGE UP (ref 11.5–15.5)
HGB BLD-MCNC: 12.1 G/DL — SIGNIFICANT CHANGE UP (ref 11.5–15.5)
IMM GRANULOCYTES NFR BLD AUTO: 0.5 % — SIGNIFICANT CHANGE UP (ref 0–0.9)
IMM GRANULOCYTES NFR BLD AUTO: 0.5 % — SIGNIFICANT CHANGE UP (ref 0–0.9)
LYMPHOCYTES # BLD AUTO: 0.89 K/UL — LOW (ref 1–3.3)
LYMPHOCYTES # BLD AUTO: 0.89 K/UL — LOW (ref 1–3.3)
LYMPHOCYTES # BLD AUTO: 20 % — SIGNIFICANT CHANGE UP (ref 13–44)
LYMPHOCYTES # BLD AUTO: 20 % — SIGNIFICANT CHANGE UP (ref 13–44)
MCHC RBC-ENTMCNC: 27.3 PG — SIGNIFICANT CHANGE UP (ref 27–34)
MCHC RBC-ENTMCNC: 27.3 PG — SIGNIFICANT CHANGE UP (ref 27–34)
MCHC RBC-ENTMCNC: 33.2 GM/DL — SIGNIFICANT CHANGE UP (ref 32–36)
MCHC RBC-ENTMCNC: 33.2 GM/DL — SIGNIFICANT CHANGE UP (ref 32–36)
MCV RBC AUTO: 82 FL — SIGNIFICANT CHANGE UP (ref 80–100)
MCV RBC AUTO: 82 FL — SIGNIFICANT CHANGE UP (ref 80–100)
MONOCYTES # BLD AUTO: 0.54 K/UL — SIGNIFICANT CHANGE UP (ref 0–0.9)
MONOCYTES # BLD AUTO: 0.54 K/UL — SIGNIFICANT CHANGE UP (ref 0–0.9)
MONOCYTES NFR BLD AUTO: 12.2 % — SIGNIFICANT CHANGE UP (ref 2–14)
MONOCYTES NFR BLD AUTO: 12.2 % — SIGNIFICANT CHANGE UP (ref 2–14)
NEUTROPHILS # BLD AUTO: 2.78 K/UL — SIGNIFICANT CHANGE UP (ref 1.8–7.4)
NEUTROPHILS # BLD AUTO: 2.78 K/UL — SIGNIFICANT CHANGE UP (ref 1.8–7.4)
NEUTROPHILS NFR BLD AUTO: 62.5 % — SIGNIFICANT CHANGE UP (ref 43–77)
NEUTROPHILS NFR BLD AUTO: 62.5 % — SIGNIFICANT CHANGE UP (ref 43–77)
NRBC # BLD: 0 /100 WBCS — SIGNIFICANT CHANGE UP (ref 0–0)
NRBC # BLD: 0 /100 WBCS — SIGNIFICANT CHANGE UP (ref 0–0)
PLATELET # BLD AUTO: 260 K/UL — SIGNIFICANT CHANGE UP (ref 150–400)
PLATELET # BLD AUTO: 260 K/UL — SIGNIFICANT CHANGE UP (ref 150–400)
POTASSIUM SERPL-MCNC: 3.6 MMOL/L — SIGNIFICANT CHANGE UP (ref 3.5–5.3)
POTASSIUM SERPL-MCNC: 3.6 MMOL/L — SIGNIFICANT CHANGE UP (ref 3.5–5.3)
POTASSIUM SERPL-SCNC: 3.6 MMOL/L — SIGNIFICANT CHANGE UP (ref 3.5–5.3)
POTASSIUM SERPL-SCNC: 3.6 MMOL/L — SIGNIFICANT CHANGE UP (ref 3.5–5.3)
PROT SERPL-MCNC: 5.9 G/DL — LOW (ref 6–8.3)
PROT SERPL-MCNC: 5.9 G/DL — LOW (ref 6–8.3)
RBC # BLD: 4.44 M/UL — SIGNIFICANT CHANGE UP (ref 3.8–5.2)
RBC # BLD: 4.44 M/UL — SIGNIFICANT CHANGE UP (ref 3.8–5.2)
RBC # FLD: 13.2 % — SIGNIFICANT CHANGE UP (ref 10.3–14.5)
RBC # FLD: 13.2 % — SIGNIFICANT CHANGE UP (ref 10.3–14.5)
SODIUM SERPL-SCNC: 141 MMOL/L — SIGNIFICANT CHANGE UP (ref 135–145)
SODIUM SERPL-SCNC: 141 MMOL/L — SIGNIFICANT CHANGE UP (ref 135–145)
WBC # BLD: 4.44 K/UL — SIGNIFICANT CHANGE UP (ref 3.8–10.5)
WBC # BLD: 4.44 K/UL — SIGNIFICANT CHANGE UP (ref 3.8–10.5)
WBC # FLD AUTO: 4.44 K/UL — SIGNIFICANT CHANGE UP (ref 3.8–10.5)
WBC # FLD AUTO: 4.44 K/UL — SIGNIFICANT CHANGE UP (ref 3.8–10.5)

## 2023-11-28 PROCEDURE — 78226 HEPATOBILIARY SYSTEM IMAGING: CPT | Mod: 26,MG

## 2023-11-28 PROCEDURE — 43239 EGD BIOPSY SINGLE/MULTIPLE: CPT

## 2023-11-28 PROCEDURE — G1004: CPT

## 2023-11-28 PROCEDURE — 88305 TISSUE EXAM BY PATHOLOGIST: CPT | Mod: 26

## 2023-11-28 PROCEDURE — 99233 SBSQ HOSP IP/OBS HIGH 50: CPT

## 2023-11-28 PROCEDURE — 99222 1ST HOSP IP/OBS MODERATE 55: CPT | Mod: 25

## 2023-11-28 RX ORDER — ONDANSETRON 8 MG/1
4 TABLET, FILM COATED ORAL ONCE
Refills: 0 | Status: COMPLETED | OUTPATIENT
Start: 2023-11-28 | End: 2023-11-28

## 2023-11-28 RX ORDER — HYDROMORPHONE HYDROCHLORIDE 2 MG/ML
0.5 INJECTION INTRAMUSCULAR; INTRAVENOUS; SUBCUTANEOUS ONCE
Refills: 0 | Status: DISCONTINUED | OUTPATIENT
Start: 2023-11-28 | End: 2023-11-28

## 2023-11-28 RX ORDER — ONDANSETRON 8 MG/1
4 TABLET, FILM COATED ORAL EVERY 6 HOURS
Refills: 0 | Status: DISCONTINUED | OUTPATIENT
Start: 2023-11-28 | End: 2023-11-29

## 2023-11-28 RX ORDER — IBUPROFEN 200 MG
400 TABLET ORAL ONCE
Refills: 0 | Status: DISCONTINUED | OUTPATIENT
Start: 2023-11-28 | End: 2023-12-01

## 2023-11-28 RX ORDER — SODIUM CHLORIDE 9 MG/ML
500 INJECTION INTRAMUSCULAR; INTRAVENOUS; SUBCUTANEOUS
Refills: 0 | Status: DISCONTINUED | OUTPATIENT
Start: 2023-11-28 | End: 2023-11-28

## 2023-11-28 RX ORDER — ACETAMINOPHEN 500 MG
1000 TABLET ORAL ONCE
Refills: 0 | Status: COMPLETED | OUTPATIENT
Start: 2023-11-28 | End: 2023-11-28

## 2023-11-28 RX ORDER — SODIUM CHLORIDE 9 MG/ML
1000 INJECTION, SOLUTION INTRAVENOUS
Refills: 0 | Status: DISCONTINUED | OUTPATIENT
Start: 2023-11-28 | End: 2023-11-29

## 2023-11-28 RX ORDER — FAMOTIDINE 10 MG/ML
20 INJECTION INTRAVENOUS ONCE
Refills: 0 | Status: COMPLETED | OUTPATIENT
Start: 2023-11-28 | End: 2023-11-28

## 2023-11-28 RX ORDER — ENOXAPARIN SODIUM 100 MG/ML
40 INJECTION SUBCUTANEOUS EVERY 24 HOURS
Refills: 0 | Status: DISCONTINUED | OUTPATIENT
Start: 2023-11-28 | End: 2023-11-29

## 2023-11-28 RX ORDER — PANTOPRAZOLE SODIUM 20 MG/1
40 TABLET, DELAYED RELEASE ORAL DAILY
Refills: 0 | Status: DISCONTINUED | OUTPATIENT
Start: 2023-11-28 | End: 2023-11-28

## 2023-11-28 RX ADMIN — Medication 1000 MILLIGRAM(S): at 04:10

## 2023-11-28 RX ADMIN — ONDANSETRON 4 MILLIGRAM(S): 8 TABLET, FILM COATED ORAL at 17:06

## 2023-11-28 RX ADMIN — Medication 400 MILLIGRAM(S): at 03:41

## 2023-11-28 RX ADMIN — Medication 30 MILLILITER(S): at 08:53

## 2023-11-28 RX ADMIN — SODIUM CHLORIDE 150 MILLILITER(S): 9 INJECTION, SOLUTION INTRAVENOUS at 00:26

## 2023-11-28 RX ADMIN — HYDROMORPHONE HYDROCHLORIDE 0.5 MILLIGRAM(S): 2 INJECTION INTRAMUSCULAR; INTRAVENOUS; SUBCUTANEOUS at 17:13

## 2023-11-28 RX ADMIN — FAMOTIDINE 20 MILLIGRAM(S): 10 INJECTION INTRAVENOUS at 08:53

## 2023-11-28 RX ADMIN — SODIUM CHLORIDE 150 MILLILITER(S): 9 INJECTION, SOLUTION INTRAVENOUS at 07:30

## 2023-11-28 RX ADMIN — Medication 12.5 MILLIGRAM(S): at 06:29

## 2023-11-28 RX ADMIN — Medication 12.5 MILLIGRAM(S): at 21:18

## 2023-11-28 RX ADMIN — Medication 30 MILLILITER(S): at 03:40

## 2023-11-28 RX ADMIN — PANTOPRAZOLE SODIUM 40 MILLIGRAM(S): 20 TABLET, DELAYED RELEASE ORAL at 04:33

## 2023-11-28 RX ADMIN — ONDANSETRON 4 MILLIGRAM(S): 8 TABLET, FILM COATED ORAL at 07:31

## 2023-11-28 RX ADMIN — Medication 400 MILLIGRAM(S): at 16:14

## 2023-11-28 RX ADMIN — ENOXAPARIN SODIUM 40 MILLIGRAM(S): 100 INJECTION SUBCUTANEOUS at 21:17

## 2023-11-28 NOTE — H&P ADULT - NSHPLABSRESULTS_GEN_ALL_CORE
12.1   4.44  )-----------( 260      ( 28 Nov 2023 07:32 )             36.4       11-28    141  |  106  |  8   ----------------------------<  79  3.6   |  24  |  0.59    Ca    8.8      28 Nov 2023 07:32    TPro  5.9<L>  /  Alb  3.9  /  TBili  0.4  /  DBili  x   /  AST  108<H>  /  ALT  181<H>  /  AlkPhos  96  11-28

## 2023-11-28 NOTE — H&P ADULT - ASSESSMENT
42 yo F w/ PMHx of Graves disease with no PSHx present to the ED w/ 3 days of intermittent epigastric pain worse after eating associate with nausea and vomiting found to have biliary colic. Failed PO challenge 2/2 persistent pain.       Plan:   - Admit to [facility] under Dr. Buckley for management of biliary colic   - OR tomorrow for laparoscopic cholecystectomy, possible open      - Appreciate cardiology risk stratification (low risk for mod risk surgery. No contraindication to proceed, documented 11/28/23)      - Consent obtained, in chart      - Pre-op labs ordered for 4AM        - Negative serum pregnancy test 11/27 (valid for 5 days as inpatient)   - Diet: regular, low fat (kosher), NPO@MN   - Pain control prn   - DVT ppx: LVX  - Med rec complete, continue home meds/allergies documented   - GI following, s/p patient's first EGD 11/28     - Found 2cm hiatal hernia, decreased vascular pattern mucosa in esophagus and stomach. F/u biopsy pathology   - Dispo: surgical floor         [facility]   [id number]

## 2023-11-28 NOTE — PROGRESS NOTE ADULT - ASSESSMENT
42 yo F with PMHx of Graves disease with no prior surgical history presents to the ED due to intermittent epigastric pain for the past 3 days. U/S and CT imaging with cholelithiasis. Surgery consulted to evaluate. Overall picture equivocal for acute cholecystitis. HIDA scan negative for acute cholecystitis or biliary obstruction.     Plan:   - No acute surgical intervention  - Care per CDU  - PO challenge  - If patient able to tolerate PO challenge can follow up with Dr. Buckley outpatient do discuss elective cholecystectomy    [facility]  [id number]   44 yo F with PMHx of Graves disease with no prior surgical history presents to the ED due to intermittent epigastric pain for the past 3 days. U/S and CT imaging with cholelithiasis. Surgery consulted to evaluate. Overall picture equivocal for acute cholecystitis. HIDA scan negative for acute cholecystitis or biliary obstruction.     Plan:   - No acute surgical intervention  - Care per CDU  - PO challenge  - If patient able to tolerate PO challenge can follow up with Dr. Maynor Buckley this week outpatient to schedule for elective cholecystectomy  - Please page surgery if unable to tolerate PO     [facility]  [id number]

## 2023-11-28 NOTE — H&P ADULT - NSHPPHYSICALEXAM_GEN_ALL_CORE
GEN: NAD, resting quietly  NEURO: AAOx3, no focal deficits  PULM: symmetric chest rise bilaterally, no increased WOB  CV: appears well perfused   ABD: Soft, Non distended, mildly tender to palpation in epigastrium without rebound tenderness/guarding/rigidity  EXTR: no cyanosis or edema, moving all extremities

## 2023-11-28 NOTE — PRE PROCEDURE NOTE - PRE PROCEDURE EVALUATION
Attending Physician: Dr Singh                            Procedure: EGD    Indication for Procedure: epigastric pain   ________________________________________________________  PAST MEDICAL & SURGICAL HISTORY:  Graves disease      No significant past surgical history        ALLERGIES:  sulfa drugs (Rash)    HOME MEDICATIONS:    AICD/PPM: [ ] yes   [x ] no    PERTINENT LAB DATA:                        12.1   4.44  )-----------( 260      ( 28 Nov 2023 07:32 )             36.4     11-28    141  |  106  |  8   ----------------------------<  79  3.6   |  24  |  0.59    Ca    8.8      28 Nov 2023 07:32    TPro  5.9<L>  /  Alb  3.9  /  TBili  0.4  /  DBili  x   /  AST  108<H>  /  ALT  181<H>  /  AlkPhos  96  11-28            HCG Quantitative, Serum: <2.0 mIU/mL (11-27-23 @ 13:06)      PHYSICAL EXAMINATION:    T(C): 36.9  HR: 63  BP: 128/68  RR: 18  SpO2: 100%    Constitutional: NAD    Neck:  No JVD  Respiratory: no resp distress   Cardiovascular: rrr  Extremities: No peripheral edema  Neurological: A/O x 3, no focal deficits        COMMENTS:    The patient is a suitable candidate for the planned procedure unless box checked [ ]  No, explain:

## 2023-11-28 NOTE — H&P ADULT - HISTORY OF PRESENT ILLNESS
42 yo F w/ PMHx of Graves disease with no PSHx present to the ED w/ 3 days of intermittent epigastric pain worse after eating associate with nausea and vomiting. Passing flatus, normal BMs.     In the ED, AVSS. Labs show no leukocytosis, normal T bili, elevation in AST/ALT to 93/181. US and CT c/w cholelithiasis. HIDA negative for acute charlene. Surgery consulted for evaluation.

## 2023-11-28 NOTE — ED CDU PROVIDER SUBSEQUENT DAY NOTE - PROGRESS NOTE DETAILS
CDU PROGRESS NOTE CASSI CASE: Pt re-evaluated by Surgery, recommend PO challenge, If patient able to tolerate PO challenge can follow up with Dr. Maynor Buckley this week outpatient to schedule for elective cholecystectomy CDU PROGRESS NOTE PA MANPREET: Pt c/o worsening pain to RUQ and epigastrium after eating snacks. Abdomen soft, non distended, +ttp RUQ, No guarding. c/d/w Surgery, will give analgesia and f/u recs. CDU PROGRESS NOTE CASSI CASE: Will continue to monitor in CDU, Surgery recs pending w/ eval from attending this am. Patient with persistent epigastric pain radiating into the chest.  Patient was seen by surgery who offered laparoscopic cholecystectomy.  GI consulted for EGD to rule out gastritis or PUD.  Patient has not eaten since 2 AM.  Will admit to surgery.

## 2023-11-28 NOTE — CONSULT NOTE ADULT - ASSESSMENT
42 yo F with PMHx of Graves disease (recently diagnosed) with no prior surgical history presents to the ED due to intermittent epigastric pain since Sunday, Reported substernal burning and dyspepsia with sensation of food "going down slowly" followed by upper abdominal pain that radiated to back. no fever or chills, no rash. Pain worsened with eating, minimal improvement after IV Pepcid    CT AP: +liver cyst, ?gallstones  US: GB polyp vs stone. no US evidence of ac cholecystitis  HIDA negative  HCG - negative    #epigastric pain ?PUD, gastritis, GERD, occult lesion vs biliary colic    RECS:  admit to Medicine  NPO except meds, will arrange for add on EGD today  Surgery following    Further plans pending EGD findings  Discussed with ED team and Hospitalist team  Discussed with pt, all questions answered    Toby Medeiros PA-C  [facility] Teaching GI Service  Available on TEAMS Mon-Fri 8a-4p  After hours and weekend coverage [phone]     42 yo F with PMHx of Graves disease (recently diagnosed) with no prior surgical history presents to the ED due to intermittent epigastric pain since Sunday, Reported substernal burning and dyspepsia with sensation of food "going down slowly" followed by upper abdominal pain that radiated to back. no fever or chills, no rash. Pain worsened with eating, minimal improvement after IV Pepcid    CT AP: +liver cyst, ?gallstones  US: GB polyp vs stone. no US evidence of ac cholecystitis  HIDA negative  HCG - negative    #epigastric pain ?PUD, gastritis, GERD, occult lesion vs biliary colic    RECS:  pt to be admitted; d/w ED/CDU team  NPO except meds, will arrange for add on EGD today  Surgery following; d/w Dr Buckley    Further plans pending EGD findings  Discussed with ED team and Hospitalist   Discussed with pt, all questions answered    Toby Medeiros PA-C  [facility] Teaching GI Service  Available on TEAMS Mon-Fri 8a-4p  After hours and weekend coverage [phone]

## 2023-11-28 NOTE — ED CDU PROVIDER SUBSEQUENT DAY NOTE - HISTORY
CDU PROGRESS NOTE PA MANPREET: Pt resting comfortably, NAD, VSS. Abdomen soft, non distended. No rebound or guarding. HIDA scan resulted, Normal hepatobiliary scan. No evidence of acute cholecystitis or biliary obstruction. Surgery recs pending.

## 2023-11-28 NOTE — CHART NOTE - NSCHARTNOTEFT_GEN_A_CORE
43yF with 4 days of intermittent, post-prandial epigastric/RUQ abdominal pain.  CT + gallstones, no sign of acute cholecystitis  US polyp? vs stones  HIDA negative  WBC normal  AST/ALT mildly elevated  T bili and alk phos normal    She has been snacking intermittently overnight and this morning, and says now pain has recurred in mid epigastrium.  No fevers, chills, or malaise.    On exam, afebrile, VSS  NAD  anicteric  Abd soft, nondistended, minimally tender epigastrium, no guarding, negative delgado's sign    Impression  Biliary colic  As patient just ate, cannot offer  lap charlene this morning  She is willing to trial PO and see how she feels.  If pain persists will plan for lap charlene    Maynor Buckley MD

## 2023-11-28 NOTE — PROGRESS NOTE ADULT - SUBJECTIVE AND OBJECTIVE BOX
GREEN TEAM SURGERY DAILY PROGRESS NOTE    SUBJECTIVE: No acute events overnight. Patient seen and examined this am.    OBJECTIVE:  Vital Signs Last 24 Hrs  T(C): 36.4 (28 Nov 2023 00:20), Max: 36.7 (27 Nov 2023 10:15)  T(F): 97.6 (28 Nov 2023 00:20), Max: 98.1 (27 Nov 2023 10:15)  HR: 82 (28 Nov 2023 00:20) (73 - 82)  BP: 106/68 (28 Nov 2023 00:20) (106/68 - 113/70)  BP(mean): --  RR: 18 (28 Nov 2023 00:20) (16 - 18)  SpO2: 98% (28 Nov 2023 00:20) (96% - 100%)    Parameters below as of 28 Nov 2023 00:20  Patient On (Oxygen Delivery Method): room air      Daily Height in cm: 149.86 (27 Nov 2023 10:15)      STANDING  lactated ringers. 1000 milliLiter(s) (150 mL/Hr) IV Continuous <Continuous>  methimazole 15 milliGRAM(s) Oral daily  metoprolol tartrate 12.5 milliGRAM(s) Oral two times a day    PRN      Labs:  141  |  102  |  8  ----------------------------<  89    (11-27)  4.1   |  27  |  0.66          Ca    9.5      11-27  Mg    x  Phos  x          Urinalysis Basic - ( 27 Nov 2023 13:06 )    Color: x / Appearance: x / SG: x / pH: x  Gluc: 89 mg/dL / Ketone: x  / Bili: x / Urobili: x   Blood: x / Protein: x / Nitrite: x   Leuk Esterase: x / RBC: x / WBC x   Sq Epi: x / Non Sq Epi: x / Bacteria: x    Physical Exam:  General: NAD  Respiratory: respirations non labored  Abdominal: +Mild epigastric tenderness to palpation, soft, NT/ND, no rebound/guarding, no masses palpated, negative Trinidad's sign  Extremities: FROM, warm  Neurological: A+Ox3

## 2023-11-28 NOTE — CHART NOTE - NSCHARTNOTEFT_GEN_A_CORE
Called to bedside to evaluate Ms. Landa i/s/o pain following PO intake. Pt seen and examined. Reports epigastric/RUQ pain similar to that which prompted ED presentation. Denies nausea/vomiting/fevers/chills/CP/SOB. On exam, soft, non distended, mild TTP epigastrium, (-) delgado sign.     Plan:   -Re-eval in AM w Green Team Attending   -Trial PPI to see if any sx resolution   -Pain control per CDU     Rene Christianson MD PGY3  Keith, [id number]

## 2023-11-28 NOTE — ED ADULT NURSE REASSESSMENT NOTE - NSFALLUNIVINTERV_ED_ALL_ED
Bed/Stretcher in lowest position, wheels locked, appropriate side rails in place/Call bell, personal items and telephone in reach/Instruct patient to call for assistance before getting out of bed/chair/stretcher/Non-slip footwear applied when patient is off stretcher/[place] to call system/Physically safe environment - no spills, clutter or unnecessary equipment/Purposeful proactive rounding/Room/bathroom lighting operational, light cord in reach

## 2023-11-28 NOTE — CONSULT NOTE ADULT - NS ATTEND AMEND GEN_ALL_CORE FT
43F with newly diagnosed Graves otherwise healthy was in her USOH until 5 days ago (evening of Friday 11/24) when she had the sudden onset of sharp upper abdominal pain. She has never had pain like this before, pain is exacerbated with eating. No fevers or chills, +back pain, +headache. Feels pressure in her chest when she eats and that food is going down slowly, which is also new. No sick contacts or unusual ingestions.    CBC unremarkable, CMP with mildly elevated transaminases but normal bili, AP and lipase. CT with one possible stone, RUQUS with one stone vs polyp, HIDA nl. Surgery following for possible biliary colic. VSS, abd soft but diffusely ttp.    Given consistent pain over 5 days that is worsened with eating, and unimpressive imaging, merits luminal eval to r/o PUD, esophagitis, gastritis, etc. Will plan for EGD today. Also d/w patient that she is due for screening colon and should schedule this as an outpatient.
Patient care and plan discussed and reviewed with Advanced Care Provider. Plan as outlined above edited by me to reflect our discussion.
X Size Of Lesion In Cm: 0.4

## 2023-11-28 NOTE — ED ADULT NURSE REASSESSMENT NOTE - NS ED NURSE REASSESS COMMENT FT1
1000-kept NPO for Endoscopy around midday as per Gi. Patient made aware.   1115-All belongings placed in a belonging bag & labeled. On hospital gown & socks. Awaiting for transport.
pt to endoscopy via stretcher
0800- patient received alert & oriented x4, ambulatory to the bathroom. Remains with periods of mid abdominal pain. Awaiting to be reevaluated by mds. IVf in progress.
Pt received from MARIA VICTORIA Conner. Pt A&O X4, oriented to CDU & plan of care discussed. Pt s/p Hida scan. NPO status maintained. Pt denies any chest pain, SOB, dizziness or palpitations. V/S stable, IV in place, patent and free of signs of infiltration. Pt resting in bed. Safety & comfort measures maintained. Call bell in reach. Will continue to monitor.

## 2023-11-28 NOTE — CONSULT NOTE ADULT - SUBJECTIVE AND OBJECTIVE BOX
Patient is a 43y old  Female who presents with a chief complaint of     HPI:   42 yo F with PMHx of Graves disease (recently diagnosed) with no prior surgical history presents to the ED due to intermittent epigastric pain since Sunday. No associated change in dietary intake, denies intake of fried/fatty foods prior to onset of pain. Reports 1 dose of Naproxen taken in effort to alleviate pain; no ASA, steroid or heavy NSAID intake. No fever or chills    Reported substernal burning and dyspepsia with sensation of food "going down slowly" followed by upper abdominal pain that radiated to back. no fever or chills, no rash.    +recent constipation on Saturday - took Miralax and passed "normal"/soft stools on Sunday    Pain increased after PO intake - last attempt at solids ~2am with increased epigastric pain and non bloody non bilious emesis    no personal or FH of GI malignancy, GB disease or PUD  no similar episodes in past  no association with menses  no rectal bleeding or melena, no sick contacts      PAST MEDICAL & SURGICAL HISTORY:  Graves disease    No significant past surgical history          Allergies  sulfa drugs (Rash)        MEDICATIONS  (STANDING):  lactated ringers. 1000 milliLiter(s) (150 mL/Hr) IV Continuous <Continuous>  methimazole 15 milliGRAM(s) Oral daily  metoprolol tartrate 12.5 milliGRAM(s) Oral two times a day  pantoprazole    Tablet 40 milliGRAM(s) Oral daily    MEDICATIONS  (PRN):  aluminum hydroxide/magnesium hydroxide/simethicone Suspension 30 milliLiter(s) Oral every 4 hours PRN Dyspepsia      Social History:      denies ETOH or tobacco use    Family History   IBD (  ) Yes   (X  ) No  GI Malignancy (  )  Yes    (  X) No    Health Management  Last Colonoscopy - NONE      Advanced Directives: ( X    ) None    (      ) DNR    (     ) DNI    (     ) Health Care Proxy:     Review of Systems:  see HPI- remainder 10 point ROS negative      Vital Signs Last 24 Hrs  T(C): 36.9 (28 Nov 2023 08:25), Max: 36.9 (28 Nov 2023 08:25)  T(F): 98.4 (28 Nov 2023 08:25), Max: 98.4 (28 Nov 2023 08:25)  HR: 64 (28 Nov 2023 08:25) (64 - 89)  BP: 98/65 (28 Nov 2023 08:25) (98/65 - 113/71)  BP(mean): --  RR: 18 (28 Nov 2023 08:25) (18 - 18)  SpO2: 97% (28 Nov 2023 08:25) (97% - 100%)    Parameters below as of 28 Nov 2023 08:25  Patient On (Oxygen Delivery Method): room air        PHYSICAL EXAM:    Constitutional: NAD, well-developed non toxic appearing female  Neck: No LAD, supple no JVD  Respiratory: bl air entry, no increased WOB  Cardiovascular: S1 and S2, regular  Gastrointestinal: BS+, soft, ND +epigastric /upper abdominal tenderness without rebound or rigidity negative Trinidad's sign  Extremities: No peripheral edema, neg clubbing, cyanosis  Vascular: 2+ peripheral pulses  Neurological: A/O x 3, no focal deficits  Psychiatric: Normal mood, normal affect  Skin: No rashes, anicteric        LABS:                        12.1   4.44  )-----------( 260      ( 28 Nov 2023 07:32 )             36.4     11-28    141  |  106  |  8   ----------------------------<  79  3.6   |  24  |  0.59    Ca    8.8      28 Nov 2023 07:32    TPro  5.9<L>  /  Alb  3.9  /  TBili  0.4  /  DBili  x   /  AST  108<H>  /  ALT  181<H>  /  AlkPhos  96  11-28  Lipase: 21 U/L (11.27.23 @ 13:06)       Urinalysis Basic - ( 28 Nov 2023 07:32 )    Color: x / Appearance: x / SG: x / pH: x  Gluc: 79 mg/dL / Ketone: x  / Bili: x / Urobili: x   Blood: x / Protein: x / Nitrite: x   Leuk Esterase: x / RBC: x / WBC x   Sq Epi: x / Non Sq Epi: x / Bacteria: x        HCG Quantitative, Serum (11.27.23 @ 13:06)   HCG Quantitative, Serum: <2.0    RADIOLOGY & ADDITIONAL TESTS:    ACC: [id number] EXAM:  CT ABDOMEN AND PELVIS IC   ORDERED BY: BHARAT RAPP     PROCEDURE DATE:  11/27/2023          INTERPRETATION:  CLINICAL INFORMATION: Epigastric pain, vomiting,   elevated transaminases.    COMPARISON: None.    CONTRAST/COMPLICATIONS:  IV Contrast: Omnipaque 350  90 cc administered   10 cc discarded  Oral Contrast: NONE  Complications: None reported at time of study completion    PROCEDURE:  CT of the Abdomen and Pelvis was performed.  Sagittal and coronal reformats were performed.    FINDINGS:  LOWER CHEST: Minimal dependent subsegmental atelectasis.    LIVER: Cyst in the medial left lobe measuring 1.4 cm. A few subcentimeter   hypodense foci that are too small to characterize.  BILE DUCTS: Normal caliber.  GALLBLADDER: A punctate hyperdense focus in the dependent gallbladder   likely reflects cholelithiasis. No appreciable wall thickening or   surrounding inflammatory changes.  SPLEEN: Within normal limits.  PANCREAS: Within normal limits.  ADRENALS: Within normal limits.  KIDNEYS/URETERS: Subcentimeter hypodense focus in the left kidney that is   too small to characterize. No hydronephrosis. Normal and symmetric renal   parenchymal enhancement.    BLADDER: Underdistended.  REPRODUCTIVE ORGANS: Intrauterine device in place.    BOWEL: No bowel obstruction. Appendix is normal.  PERITONEUM: No ascites.  VESSELS: Within normal limits.  RETROPERITONEUM/LYMPH NODES: No lymphadenopathy.  ABDOMINAL WALL: Within normal limits.  BONES: Mild degenerative changes.    IMPRESSION:  *  Probable cholelithiasis. Consider right upper quadrant ultrasound for   further assessment. No CT evidence for acute cholecystitis.  *  No biliary duct dilation.        ACC: [id number] EXAM:  US ABDOMEN RT UPR QUADRANT   ORDERED BY:  CHRIS CHEEMA     PROCEDURE DATE:  11/27/2023          INTERPRETATION:  CLINICAL INFORMATION: Right upper quadrant pain.    COMPARISON: None available.    TECHNIQUE: Sonography of the right upper quadrant.    FINDINGS:  Liver: Within normal limits.  Bile ducts: Normal caliber. Common bile duct measures 6 mm.  Gallbladder: Questionable echogenic focus seen on some images, polyp or   stone. No wall thickening or pericholecysticfluid. Negative sonographic   Trinidad sign.  Pancreas: Visualized portions are within normal limits.  Right kidney: 9.5 cm. No hydronephrosis.  Ascites: None.  IVC: Visualized portions are within normal limits.    IMPRESSION:  Questionable gallbladderpolyp or stone. No sonographic evidence of acute   cholecystitis.        ACC: [id number] EXAM:  NM HEPATOBILIARY IMG   ORDERED BY: SACHIN CHILDS     PROCEDURE DATE:  11/28/2023          INTERPRETATION:  PROCEDURE: NM HEPATOBILIARY SCAN    CLINICAL INFORMATION: 43-year-old female referred from the emergency room   for evaluation of acute cholecystitis.    DURATION of DYNAMIC SERIES: 45 minutes  RADIOPHARMACEUTICAL: 3.0 mCi Tc-99m-Mebrofenin, I.V.,    TECHNIQUE: Dynamic imaging of the anterior abdomen was performed   following radiopharmaceutical injection. Static images of the abdomen in   the anterior, right anterior oblique, and right lateral views were   obtained immediately thereafter.    COMPARISON: None    OTHER STUDIES USED FOR CORRELATION: None    FINDINGS: There is prompt, homogeneous uptake of radiopharmaceutical by   the hepatocytes. Activity is seen in the gallbladder at approximately 15   minutes and in the bowel at approximately 20 minutes. There is good   clearance of activity from the liver by the end of the study.    IMPRESSION: Normal hepatobiliary scan.    No evidence of acute cholecystitis or biliary obstruction.             Patient is a 43y old  Female who presents with a chief complaint of epigastric pain.    HPI:   44 yo F with PMHx of Graves disease (recently diagnosed) with no prior surgical history presents to the ED due to intermittent epigastric pain since Sunday. No associated change in dietary intake, denies intake of fried/fatty foods prior to onset of pain. Reports 1 dose of Naproxen taken in effort to alleviate pain; no ASA, steroid or heavy NSAID intake. No fever or chills    Reported substernal burning and dyspepsia with sensation of food "going down slowly" followed by upper abdominal pain that radiated to back. no fever or chills, no rash.    +recent constipation on Saturday - took Miralax and passed "normal"/soft stools on Sunday    Pain increased after PO intake - last attempt at solids ~2am with increased epigastric pain and non bloody non bilious emesis    no personal or FH of GI malignancy, GB disease or PUD  no similar episodes in past  no association with menses  no rectal bleeding or melena, no sick contacts      PAST MEDICAL & SURGICAL HISTORY:  Graves disease    No significant past surgical history          Allergies  sulfa drugs (Rash)        MEDICATIONS  (STANDING):  lactated ringers. 1000 milliLiter(s) (150 mL/Hr) IV Continuous <Continuous>  methimazole 15 milliGRAM(s) Oral daily  metoprolol tartrate 12.5 milliGRAM(s) Oral two times a day  pantoprazole    Tablet 40 milliGRAM(s) Oral daily    MEDICATIONS  (PRN):  aluminum hydroxide/magnesium hydroxide/simethicone Suspension 30 milliLiter(s) Oral every 4 hours PRN Dyspepsia      Social History:      denies ETOH or tobacco use    Family History   IBD (  ) Yes   (X  ) No  GI Malignancy (  )  Yes    (  X) No    Health Management  Last Colonoscopy - NONE      Advanced Directives: ( X    ) None    (      ) DNR    (     ) DNI    (     ) Health Care Proxy:     Review of Systems:  see HPI- remainder 10 point ROS negative      Vital Signs Last 24 Hrs  T(C): 36.9 (28 Nov 2023 08:25), Max: 36.9 (28 Nov 2023 08:25)  T(F): 98.4 (28 Nov 2023 08:25), Max: 98.4 (28 Nov 2023 08:25)  HR: 64 (28 Nov 2023 08:25) (64 - 89)  BP: 98/65 (28 Nov 2023 08:25) (98/65 - 113/71)  BP(mean): --  RR: 18 (28 Nov 2023 08:25) (18 - 18)  SpO2: 97% (28 Nov 2023 08:25) (97% - 100%)    Parameters below as of 28 Nov 2023 08:25  Patient On (Oxygen Delivery Method): room air        PHYSICAL EXAM:    Constitutional: NAD, well-developed non toxic appearing female  Neck: No LAD, supple no JVD  Respiratory: bl air entry, no increased WOB  Cardiovascular: S1 and S2, regular  Gastrointestinal: BS+, soft, ND +epigastric /upper abdominal tenderness without rebound or rigidity negative Trinidad's sign  Extremities: No peripheral edema, neg clubbing, cyanosis  Vascular: 2+ peripheral pulses  Neurological: A/O x 3, no focal deficits  Psychiatric: Normal mood, normal affect  Skin: No rashes, anicteric        LABS:                        12.1   4.44  )-----------( 260      ( 28 Nov 2023 07:32 )             36.4     11-28    141  |  106  |  8   ----------------------------<  79  3.6   |  24  |  0.59    Ca    8.8      28 Nov 2023 07:32    TPro  5.9<L>  /  Alb  3.9  /  TBili  0.4  /  DBili  x   /  AST  108<H>  /  ALT  181<H>  /  AlkPhos  96  11-28  Lipase: 21 U/L (11.27.23 @ 13:06)       Urinalysis Basic - ( 28 Nov 2023 07:32 )    Color: x / Appearance: x / SG: x / pH: x  Gluc: 79 mg/dL / Ketone: x  / Bili: x / Urobili: x   Blood: x / Protein: x / Nitrite: x   Leuk Esterase: x / RBC: x / WBC x   Sq Epi: x / Non Sq Epi: x / Bacteria: x        HCG Quantitative, Serum (11.27.23 @ 13:06)   HCG Quantitative, Serum: <2.0    RADIOLOGY & ADDITIONAL TESTS:    ACC: [id number] EXAM:  CT ABDOMEN AND PELVIS IC   ORDERED BY: BHARAT RAPP     PROCEDURE DATE:  11/27/2023          INTERPRETATION:  CLINICAL INFORMATION: Epigastric pain, vomiting,   elevated transaminases.    COMPARISON: None.    CONTRAST/COMPLICATIONS:  IV Contrast: Omnipaque 350  90 cc administered   10 cc discarded  Oral Contrast: NONE  Complications: None reported at time of study completion    PROCEDURE:  CT of the Abdomen and Pelvis was performed.  Sagittal and coronal reformats were performed.    FINDINGS:  LOWER CHEST: Minimal dependent subsegmental atelectasis.    LIVER: Cyst in the medial left lobe measuring 1.4 cm. A few subcentimeter   hypodense foci that are too small to characterize.  BILE DUCTS: Normal caliber.  GALLBLADDER: A punctate hyperdense focus in the dependent gallbladder   likely reflects cholelithiasis. No appreciable wall thickening or   surrounding inflammatory changes.  SPLEEN: Within normal limits.  PANCREAS: Within normal limits.  ADRENALS: Within normal limits.  KIDNEYS/URETERS: Subcentimeter hypodense focus in the left kidney that is   too small to characterize. No hydronephrosis. Normal and symmetric renal   parenchymal enhancement.    BLADDER: Underdistended.  REPRODUCTIVE ORGANS: Intrauterine device in place.    BOWEL: No bowel obstruction. Appendix is normal.  PERITONEUM: No ascites.  VESSELS: Within normal limits.  RETROPERITONEUM/LYMPH NODES: No lymphadenopathy.  ABDOMINAL WALL: Within normal limits.  BONES: Mild degenerative changes.    IMPRESSION:  *  Probable cholelithiasis. Consider right upper quadrant ultrasound for   further assessment. No CT evidence for acute cholecystitis.  *  No biliary duct dilation.        ACC: [id number] EXAM:  US ABDOMEN RT UPR QUADRANT   ORDERED BY:  CHRIS CHEEMA     PROCEDURE DATE:  11/27/2023          INTERPRETATION:  CLINICAL INFORMATION: Right upper quadrant pain.    COMPARISON: None available.    TECHNIQUE: Sonography of the right upper quadrant.    FINDINGS:  Liver: Within normal limits.  Bile ducts: Normal caliber. Common bile duct measures 6 mm.  Gallbladder: Questionable echogenic focus seen on some images, polyp or   stone. No wall thickening or pericholecysticfluid. Negative sonographic   Trinidad sign.  Pancreas: Visualized portions are within normal limits.  Right kidney: 9.5 cm. No hydronephrosis.  Ascites: None.  IVC: Visualized portions are within normal limits.    IMPRESSION:  Questionable gallbladderpolyp or stone. No sonographic evidence of acute   cholecystitis.        ACC: [id number] EXAM:  NM HEPATOBILIARY IMG   ORDERED BY: SACHIN CHILDS     PROCEDURE DATE:  11/28/2023          INTERPRETATION:  PROCEDURE: NM HEPATOBILIARY SCAN    CLINICAL INFORMATION: 43-year-old female referred from the emergency room   for evaluation of acute cholecystitis.    DURATION of DYNAMIC SERIES: 45 minutes  RADIOPHARMACEUTICAL: 3.0 mCi Tc-99m-Mebrofenin, I.V.,    TECHNIQUE: Dynamic imaging of the anterior abdomen was performed   following radiopharmaceutical injection. Static images of the abdomen in   the anterior, right anterior oblique, and right lateral views were   obtained immediately thereafter.    COMPARISON: None    OTHER STUDIES USED FOR CORRELATION: None    FINDINGS: There is prompt, homogeneous uptake of radiopharmaceutical by   the hepatocytes. Activity is seen in the gallbladder at approximately 15   minutes and in the bowel at approximately 20 minutes. There is good   clearance of activity from the liver by the end of the study.    IMPRESSION: Normal hepatobiliary scan.    No evidence of acute cholecystitis or biliary obstruction.

## 2023-11-28 NOTE — ED CDU PROVIDER SUBSEQUENT DAY NOTE - PHYSICAL EXAMINATION
Const:  Alert and interactive, no acute distress  HEENT: Normocephalic, atraumatic; Moist mucosa; Oropharynx clear  CV: Heart regular rate and rhythm, normal S1/2, no murmurs; Extremities WWPx4  Pulm: Lungs clear to auscultation bilaterally, no crackles, no wheezing. No increased work of breathing  GI: Abdomen soft, non-distended; non tender. No rebound or guarding.  Skin: No rash noted  Neuro: Alert; Normal tone

## 2023-11-28 NOTE — CONSULT NOTE ADULT - SUBJECTIVE AND OBJECTIVE BOX
DATE OF SERVICE: 11-28-23 @ 11:26    CHIEF COMPLAINT:Patient is a 43y old  Female who presents with a chief complaint of abdominal pain a/w eating.    HISTORY OF PRESENT ILLNESS: 42 yo F with PMHx of Graves disease with no prior surgical history presents to the ED due to intermittent epigastric pain for the past 3 days. U/S and CT imaging with cholelithiasis. Surgery consulted to evaluate. Overall picture equivocal for acute cholecystitis. HIDA scan negative for acute cholecystitis or biliary obstruction.       PAST MEDICAL & SURGICAL HISTORY:  Graves disease      No significant past surgical history              MEDICATIONS:  metoprolol tartrate 12.5 milliGRAM(s) Oral two times a day          aluminum hydroxide/magnesium hydroxide/simethicone Suspension 30 milliLiter(s) Oral every 4 hours PRN  pantoprazole    Tablet 40 milliGRAM(s) Oral daily    methimazole 15 milliGRAM(s) Oral daily    lactated ringers. 1000 milliLiter(s) IV Continuous <Continuous>      FAMILY HISTORY:  No pertinent family history in first degree relatives        Non-contributory    SOCIAL HISTORY:    [- ] Tobacco  [- ] Drugs  [- ] Alcohol    Allergies    sulfa drugs (Rash)    Intolerances    	    REVIEW OF SYSTEMS:  CONSTITUTIONAL: No fever  EYES: No eye pain, visual disturbances, or discharge  ENMT:  No difficulty hearing, tinnitus  NECK: No pain or stiffness  RESPIRATORY: No cough, wheezing,  CARDIOVASCULAR: No chest pain, palpitations, passing out, dizziness, or leg swelling  GASTROINTESTINAL:  No nausea, vomiting, diarrhea or constipation. No melena. + abdominal pain  GENITOURINARY: No dysuria, hematuria  NEUROLOGICAL: No stroke like symptoms  SKIN: No burning or lesions   ENDOCRINE: No heat or cold intolerance  MUSCULOSKELETAL: No joint pain or swelling  PSYCHIATRIC: No  anxiety, mood swings  HEME/LYMPH: No bleeding gums  ALLERGY AND IMMUNOLOGIC: No hives or eczema	    All other ROS negative    PHYSICAL EXAM:  T(C): 36.9 (11-28-23 @ 08:25), Max: 36.9 (11-28-23 @ 08:25)  HR: 64 (11-28-23 @ 08:25) (64 - 89)  BP: 98/65 (11-28-23 @ 08:25) (98/65 - 113/71)  RR: 18 (11-28-23 @ 08:25) (18 - 18)  SpO2: 97% (11-28-23 @ 08:25) (97% - 100%)  Wt(kg): --  I&O's Summary      Appearance: Normal	  HEENT:   Normal oral mucosa, EOMI	  Cardiovascular:  S1 S2, No JVD,    Respiratory: Lungs clear to auscultation	  Psychiatry: Alert  Gastrointestinal:  Soft, Non-tender, + BS	  Skin: No rashes   Neurologic: Non-focal  Extremities:  No edema  Vascular: Peripheral pulses palpable    	    	  	  CARDIAC MARKERS:  Labs personally reviewed by me                                  12.1   4.44  )-----------( 260      ( 28 Nov 2023 07:32 )             36.4     11-28    141  |  106  |  8   ----------------------------<  79  3.6   |  24  |  0.59    Ca    8.8      28 Nov 2023 07:32    TPro  5.9<L>  /  Alb  3.9  /  TBili  0.4  /  DBili  x   /  AST  108<H>  /  ALT  181<H>  /  AlkPhos  96  11-28          EKG: Personally reviewed by me - NSR  Radiology: Personally reviewed by me -     < from: [facility] Hepatobiliary Imaging (11.28.23 @ 00:00) >  FINDINGS: There is prompt, homogeneous uptake of radiopharmaceutical by   the hepatocytes. Activity is seen in the gallbladder at approximately 15   minutes and in the bowel at approximately 20 minutes. There is good   clearance of activity from the liver by the end of the study.    IMPRESSION: Normal hepatobiliary scan.    No evidence of acute cholecystitis or biliary obstruction.    < end of copied text >  < from: CT Abdomen and Pelvis w/ IV Cont (11.27.23 @ 16:01) >  IMPRESSION:  *  Probable cholelithiasis. Consider right upper quadrant ultrasound for   further assessment. No CT evidence for acute cholecystitis.  *  No biliary duct dilation.    < end of copied text >      Assessment /Plan:   42 yo F with PMHx of Graves disease with no prior surgical history presents to the ED due to intermittent epigastric pain for the past 3 days. U/S and CT imaging with cholelithiasis. Overall picture equivocal for acute cholecystitis. HIDA scan negative for acute cholecystitis or biliary obstruction.     1. Acute Cholecystitis  - CT A/P shows probable cholelithiasis  - HIDA scan negative  - PO trial overnight with patient reporting increased pain again  - As per surgery, will plan for lap choley    2. Cardiac Risk Stratification  - ECG NSR  - Reports excellent functional capacity  - No hx of severe AS/MS. No appreciable murmur on exam.  - Patient is low risk for mod risk surgery. No contraindication to proceed.     Differential diagnosis and plan of care discussed with patient after the evaluation. Counseling on diet, nutritional counseling, weight management, exercise and medication compliance was done.   Advanced care planning/advanced directives discussed with patient/family. DNR status including forceful chest compressions to attempt to restart the heart, ventilator support/artificial breathing, electric shock, artificial nutrition, health care proxy, Molst form all discussed with pt. Pt wishes to consider. More than fifteen minutes spent on discussing advanced directives.     Sabina Salazar [facility]  Cristofer Han DO [facility]  Cardiovascular Medicine  [address] [place] [address] Dr, [address]  Available for call or text via [employer]  Office [phone]   DATE OF SERVICE: 11-28-23 @ 11:26    CHIEF COMPLAINT:Patient is a 43y old  Female who presents with a chief complaint of abdominal pain a/w eating.    HISTORY OF PRESENT ILLNESS: 44 yo F with PMHx of Graves disease with no prior surgical history presents to the ED due to intermittent epigastric pain for the past 3 days. U/S and CT imaging with cholelithiasis. Surgery consulted to evaluate. Overall picture equivocal for acute cholecystitis. HIDA scan negative for acute cholecystitis or biliary obstruction.       PAST MEDICAL & SURGICAL HISTORY:  Graves disease      No significant past surgical history        MEDICATIONS:  metoprolol tartrate 12.5 milliGRAM(s) Oral two times a day    aluminum hydroxide/magnesium hydroxide/simethicone Suspension 30 milliLiter(s) Oral every 4 hours PRN  pantoprazole    Tablet 40 milliGRAM(s) Oral daily    methimazole 15 milliGRAM(s) Oral daily    lactated ringers. 1000 milliLiter(s) IV Continuous <Continuous>      FAMILY HISTORY:  No pertinent family history in first degree relatives        Non-contributory    SOCIAL HISTORY:    [- ] Tobacco  [- ] Drugs  [- ] Alcohol    Allergies    sulfa drugs (Rash)    Intolerances    	    REVIEW OF SYSTEMS:  CONSTITUTIONAL: No fever  EYES: No eye pain, visual disturbances, or discharge  ENMT:  No difficulty hearing, tinnitus  NECK: No pain or stiffness  RESPIRATORY: No cough, wheezing,  CARDIOVASCULAR: No chest pain, palpitations, passing out, dizziness, or leg swelling  GASTROINTESTINAL:  No nausea, vomiting, diarrhea or constipation. No melena. + abdominal pain  GENITOURINARY: No dysuria, hematuria  NEUROLOGICAL: No stroke like symptoms  SKIN: No burning or lesions   ENDOCRINE: No heat or cold intolerance  MUSCULOSKELETAL: No joint pain or swelling  PSYCHIATRIC: No  anxiety, mood swings  HEME/LYMPH: No bleeding gums  ALLERGY AND IMMUNOLOGIC: No hives or eczema	    All other ROS negative    PHYSICAL EXAM:  T(C): 36.9 (11-28-23 @ 08:25), Max: 36.9 (11-28-23 @ 08:25)  HR: 64 (11-28-23 @ 08:25) (64 - 89)  BP: 98/65 (11-28-23 @ 08:25) (98/65 - 113/71)  RR: 18 (11-28-23 @ 08:25) (18 - 18)  SpO2: 97% (11-28-23 @ 08:25) (97% - 100%)  Wt(kg): --  I&O's Summary      Appearance: Normal	  HEENT:   Normal oral mucosa, EOMI	  Cardiovascular:  S1 S2, No JVD,    Respiratory: Lungs clear to auscultation	  Psychiatry: Alert  Gastrointestinal:  Soft, Non-tender, + BS	  Skin: No rashes   Neurologic: Non-focal  Extremities:  No edema  Vascular: Peripheral pulses palpable    	    	  	  CARDIAC MARKERS:  Labs personally reviewed by me                                  12.1   4.44  )-----------( 260      ( 28 Nov 2023 07:32 )             36.4     11-28    141  |  106  |  8   ----------------------------<  79  3.6   |  24  |  0.59    Ca    8.8      28 Nov 2023 07:32    TPro  5.9<L>  /  Alb  3.9  /  TBili  0.4  /  DBili  x   /  AST  108<H>  /  ALT  181<H>  /  AlkPhos  96  11-28          EKG: Personally reviewed by me - NSR  Radiology: Personally reviewed by me -     < from: [facility] Hepatobiliary Imaging (11.28.23 @ 00:00) >  FINDINGS: There is prompt, homogeneous uptake of radiopharmaceutical by   the hepatocytes. Activity is seen in the gallbladder at approximately 15   minutes and in the bowel at approximately 20 minutes. There is good   clearance of activity from the liver by the end of the study.    IMPRESSION: Normal hepatobiliary scan.    No evidence of acute cholecystitis or biliary obstruction.    < end of copied text >  < from: CT Abdomen and Pelvis w/ IV Cont (11.27.23 @ 16:01) >  IMPRESSION:  *  Probable cholelithiasis. Consider right upper quadrant ultrasound for   further assessment. No CT evidence for acute cholecystitis.  *  No biliary duct dilation.               Assessment /Plan:   44 yo F with PMHx of Graves disease with no prior surgical history presents to the ED due to intermittent epigastric pain for the past 3 days. U/S and CT imaging with cholelithiasis. Overall picture equivocal for acute cholecystitis. HIDA scan negative for acute cholecystitis or biliary obstruction.     1. Acute Cholecystitis  - CT A/P shows probable cholelithiasis  - HIDA scan negative  - PO trial overnight with patient reporting increased pain again  - As per surgery, will plan for lap charlene    2. Cardiac Risk Stratification  - ECG NSR  - Reports excellent functional capacity  - No hx of severe AS/MS. No appreciable murmur on exam.  - Patient is low risk for mod risk surgery. No contraindication to proceed.             Differential diagnosis and plan of care discussed with patient after the evaluation. Counseling on diet, nutritional counseling, weight management, exercise and medication compliance was done.   Advanced care planning/advanced directives discussed with patient/family. DNR status including forceful chest compressions to attempt to restart the heart, ventilator support/artificial breathing, electric shock, artificial nutrition, health care proxy, Molst form all discussed with pt. Pt wishes to consider. More than fifteen minutes spent on discussing advanced directives.     Sabina Salazar [facility]  Cristofer Han DO [facility]  Cardiovascular Medicine  [address] [place] [address] Dr, [address]  Available for call or text via [employer] TE[employer]  Office [phone]

## 2023-11-29 ENCOUNTER — TRANSCRIPTION ENCOUNTER (OUTPATIENT)
Age: 43
End: 2023-11-29

## 2023-11-29 ENCOUNTER — APPOINTMENT (OUTPATIENT)
Dept: SURGERY | Facility: HOSPITAL | Age: 43
End: 2023-11-29
Payer: COMMERCIAL

## 2023-11-29 ENCOUNTER — RESULT REVIEW (OUTPATIENT)
Age: 43
End: 2023-11-29

## 2023-11-29 LAB
ALBUMIN SERPL ELPH-MCNC: 3.8 G/DL — SIGNIFICANT CHANGE UP (ref 3.3–5)
ALBUMIN SERPL ELPH-MCNC: 3.8 G/DL — SIGNIFICANT CHANGE UP (ref 3.3–5)
ALP SERPL-CCNC: 103 U/L — SIGNIFICANT CHANGE UP (ref 40–120)
ALP SERPL-CCNC: 103 U/L — SIGNIFICANT CHANGE UP (ref 40–120)
ALT FLD-CCNC: 193 U/L — HIGH (ref 10–45)
ALT FLD-CCNC: 193 U/L — HIGH (ref 10–45)
ANION GAP SERPL CALC-SCNC: 8 MMOL/L — SIGNIFICANT CHANGE UP (ref 5–17)
ANION GAP SERPL CALC-SCNC: 8 MMOL/L — SIGNIFICANT CHANGE UP (ref 5–17)
APTT BLD: 34.4 SEC — SIGNIFICANT CHANGE UP (ref 24.5–35.6)
APTT BLD: 34.4 SEC — SIGNIFICANT CHANGE UP (ref 24.5–35.6)
AST SERPL-CCNC: 90 U/L — HIGH (ref 10–40)
AST SERPL-CCNC: 90 U/L — HIGH (ref 10–40)
BILIRUB SERPL-MCNC: 0.3 MG/DL — SIGNIFICANT CHANGE UP (ref 0.2–1.2)
BILIRUB SERPL-MCNC: 0.3 MG/DL — SIGNIFICANT CHANGE UP (ref 0.2–1.2)
BLD GP AB SCN SERPL QL: NEGATIVE — SIGNIFICANT CHANGE UP
BLD GP AB SCN SERPL QL: NEGATIVE — SIGNIFICANT CHANGE UP
BUN SERPL-MCNC: 6 MG/DL — LOW (ref 7–23)
BUN SERPL-MCNC: 6 MG/DL — LOW (ref 7–23)
CALCIUM SERPL-MCNC: 8.9 MG/DL — SIGNIFICANT CHANGE UP (ref 8.4–10.5)
CALCIUM SERPL-MCNC: 8.9 MG/DL — SIGNIFICANT CHANGE UP (ref 8.4–10.5)
CHLORIDE SERPL-SCNC: 102 MMOL/L — SIGNIFICANT CHANGE UP (ref 96–108)
CHLORIDE SERPL-SCNC: 102 MMOL/L — SIGNIFICANT CHANGE UP (ref 96–108)
CO2 SERPL-SCNC: 26 MMOL/L — SIGNIFICANT CHANGE UP (ref 22–31)
CO2 SERPL-SCNC: 26 MMOL/L — SIGNIFICANT CHANGE UP (ref 22–31)
CREAT SERPL-MCNC: 0.62 MG/DL — SIGNIFICANT CHANGE UP (ref 0.5–1.3)
CREAT SERPL-MCNC: 0.62 MG/DL — SIGNIFICANT CHANGE UP (ref 0.5–1.3)
EGFR: 112 ML/MIN/1.73M2 — SIGNIFICANT CHANGE UP
EGFR: 112 ML/MIN/1.73M2 — SIGNIFICANT CHANGE UP
GLUCOSE SERPL-MCNC: 85 MG/DL — SIGNIFICANT CHANGE UP (ref 70–99)
GLUCOSE SERPL-MCNC: 85 MG/DL — SIGNIFICANT CHANGE UP (ref 70–99)
HCG SERPL-ACNC: <2 MIU/ML — SIGNIFICANT CHANGE UP
HCG SERPL-ACNC: <2 MIU/ML — SIGNIFICANT CHANGE UP
HCT VFR BLD CALC: 36.1 % — SIGNIFICANT CHANGE UP (ref 34.5–45)
HCT VFR BLD CALC: 36.1 % — SIGNIFICANT CHANGE UP (ref 34.5–45)
HGB BLD-MCNC: 11.9 G/DL — SIGNIFICANT CHANGE UP (ref 11.5–15.5)
HGB BLD-MCNC: 11.9 G/DL — SIGNIFICANT CHANGE UP (ref 11.5–15.5)
INR BLD: 1.14 RATIO — SIGNIFICANT CHANGE UP (ref 0.85–1.18)
INR BLD: 1.14 RATIO — SIGNIFICANT CHANGE UP (ref 0.85–1.18)
MAGNESIUM SERPL-MCNC: 1.9 MG/DL — SIGNIFICANT CHANGE UP (ref 1.6–2.6)
MAGNESIUM SERPL-MCNC: 1.9 MG/DL — SIGNIFICANT CHANGE UP (ref 1.6–2.6)
MCHC RBC-ENTMCNC: 27 PG — SIGNIFICANT CHANGE UP (ref 27–34)
MCHC RBC-ENTMCNC: 27 PG — SIGNIFICANT CHANGE UP (ref 27–34)
MCHC RBC-ENTMCNC: 33 GM/DL — SIGNIFICANT CHANGE UP (ref 32–36)
MCHC RBC-ENTMCNC: 33 GM/DL — SIGNIFICANT CHANGE UP (ref 32–36)
MCV RBC AUTO: 82 FL — SIGNIFICANT CHANGE UP (ref 80–100)
MCV RBC AUTO: 82 FL — SIGNIFICANT CHANGE UP (ref 80–100)
NRBC # BLD: 0 /100 WBCS — SIGNIFICANT CHANGE UP (ref 0–0)
NRBC # BLD: 0 /100 WBCS — SIGNIFICANT CHANGE UP (ref 0–0)
PHOSPHATE SERPL-MCNC: 2.6 MG/DL — SIGNIFICANT CHANGE UP (ref 2.5–4.5)
PHOSPHATE SERPL-MCNC: 2.6 MG/DL — SIGNIFICANT CHANGE UP (ref 2.5–4.5)
PLATELET # BLD AUTO: 240 K/UL — SIGNIFICANT CHANGE UP (ref 150–400)
PLATELET # BLD AUTO: 240 K/UL — SIGNIFICANT CHANGE UP (ref 150–400)
POTASSIUM SERPL-MCNC: 3.6 MMOL/L — SIGNIFICANT CHANGE UP (ref 3.5–5.3)
POTASSIUM SERPL-MCNC: 3.6 MMOL/L — SIGNIFICANT CHANGE UP (ref 3.5–5.3)
POTASSIUM SERPL-SCNC: 3.6 MMOL/L — SIGNIFICANT CHANGE UP (ref 3.5–5.3)
POTASSIUM SERPL-SCNC: 3.6 MMOL/L — SIGNIFICANT CHANGE UP (ref 3.5–5.3)
PROT SERPL-MCNC: 6.2 G/DL — SIGNIFICANT CHANGE UP (ref 6–8.3)
PROT SERPL-MCNC: 6.2 G/DL — SIGNIFICANT CHANGE UP (ref 6–8.3)
PROTHROM AB SERPL-ACNC: 11.9 SEC — SIGNIFICANT CHANGE UP (ref 9.5–13)
PROTHROM AB SERPL-ACNC: 11.9 SEC — SIGNIFICANT CHANGE UP (ref 9.5–13)
RBC # BLD: 4.4 M/UL — SIGNIFICANT CHANGE UP (ref 3.8–5.2)
RBC # BLD: 4.4 M/UL — SIGNIFICANT CHANGE UP (ref 3.8–5.2)
RBC # FLD: 13.2 % — SIGNIFICANT CHANGE UP (ref 10.3–14.5)
RBC # FLD: 13.2 % — SIGNIFICANT CHANGE UP (ref 10.3–14.5)
RH IG SCN BLD-IMP: POSITIVE — SIGNIFICANT CHANGE UP
RH IG SCN BLD-IMP: POSITIVE — SIGNIFICANT CHANGE UP
SODIUM SERPL-SCNC: 136 MMOL/L — SIGNIFICANT CHANGE UP (ref 135–145)
SODIUM SERPL-SCNC: 136 MMOL/L — SIGNIFICANT CHANGE UP (ref 135–145)
SURGICAL PATHOLOGY STUDY: SIGNIFICANT CHANGE UP
SURGICAL PATHOLOGY STUDY: SIGNIFICANT CHANGE UP
WBC # BLD: 6.13 K/UL — SIGNIFICANT CHANGE UP (ref 3.8–10.5)
WBC # BLD: 6.13 K/UL — SIGNIFICANT CHANGE UP (ref 3.8–10.5)
WBC # FLD AUTO: 6.13 K/UL — SIGNIFICANT CHANGE UP (ref 3.8–10.5)
WBC # FLD AUTO: 6.13 K/UL — SIGNIFICANT CHANGE UP (ref 3.8–10.5)

## 2023-11-29 PROCEDURE — 99232 SBSQ HOSP IP/OBS MODERATE 35: CPT

## 2023-11-29 PROCEDURE — 47562 LAPAROSCOPIC CHOLECYSTECTOMY: CPT

## 2023-11-29 PROCEDURE — 88304 TISSUE EXAM BY PATHOLOGIST: CPT | Mod: 26

## 2023-11-29 DEVICE — CLIP APPLIER COVIDIEN ENDOCLIP III 5MM: Type: IMPLANTABLE DEVICE | Status: FUNCTIONAL

## 2023-11-29 RX ORDER — POTASSIUM PHOSPHATE, MONOBASIC POTASSIUM PHOSPHATE, DIBASIC 236; 224 MG/ML; MG/ML
15 INJECTION, SOLUTION INTRAVENOUS ONCE
Refills: 0 | Status: COMPLETED | OUTPATIENT
Start: 2023-11-29 | End: 2023-11-29

## 2023-11-29 RX ORDER — SODIUM CHLORIDE 9 MG/ML
1000 INJECTION, SOLUTION INTRAVENOUS
Refills: 0 | Status: DISCONTINUED | OUTPATIENT
Start: 2023-11-29 | End: 2023-11-29

## 2023-11-29 RX ORDER — ACETAMINOPHEN 500 MG
1000 TABLET ORAL ONCE
Refills: 0 | Status: COMPLETED | OUTPATIENT
Start: 2023-11-29 | End: 2023-11-29

## 2023-11-29 RX ORDER — OXYCODONE HYDROCHLORIDE 5 MG/1
1 TABLET ORAL
Qty: 10 | Refills: 0
Start: 2023-11-29

## 2023-11-29 RX ORDER — ONDANSETRON 8 MG/1
4 TABLET, FILM COATED ORAL ONCE
Refills: 0 | Status: COMPLETED | OUTPATIENT
Start: 2023-11-29 | End: 2023-11-29

## 2023-11-29 RX ORDER — FENTANYL CITRATE 50 UG/ML
25 INJECTION INTRAVENOUS
Refills: 0 | Status: DISCONTINUED | OUTPATIENT
Start: 2023-11-29 | End: 2023-11-29

## 2023-11-29 RX ORDER — ONDANSETRON 8 MG/1
4 TABLET, FILM COATED ORAL EVERY 6 HOURS
Refills: 0 | Status: DISCONTINUED | OUTPATIENT
Start: 2023-11-29 | End: 2023-11-30

## 2023-11-29 RX ORDER — FENTANYL CITRATE 50 UG/ML
50 INJECTION INTRAVENOUS ONCE
Refills: 0 | Status: DISCONTINUED | OUTPATIENT
Start: 2023-11-29 | End: 2023-11-29

## 2023-11-29 RX ORDER — OXYCODONE HYDROCHLORIDE 5 MG/1
5 TABLET ORAL EVERY 4 HOURS
Refills: 0 | Status: DISCONTINUED | OUTPATIENT
Start: 2023-11-29 | End: 2023-12-01

## 2023-11-29 RX ORDER — MAGNESIUM SULFATE 500 MG/ML
2 VIAL (ML) INJECTION ONCE
Refills: 0 | Status: COMPLETED | OUTPATIENT
Start: 2023-11-29 | End: 2023-11-29

## 2023-11-29 RX ORDER — OXYCODONE HYDROCHLORIDE 5 MG/1
2.5 TABLET ORAL EVERY 4 HOURS
Refills: 0 | Status: DISCONTINUED | OUTPATIENT
Start: 2023-11-29 | End: 2023-12-01

## 2023-11-29 RX ORDER — CHLORHEXIDINE GLUCONATE 213 G/1000ML
1 SOLUTION TOPICAL
Refills: 0 | Status: DISCONTINUED | OUTPATIENT
Start: 2023-11-29 | End: 2023-11-29

## 2023-11-29 RX ORDER — ACETAMINOPHEN 500 MG
975 TABLET ORAL EVERY 6 HOURS
Refills: 0 | Status: DISCONTINUED | OUTPATIENT
Start: 2023-11-29 | End: 2023-12-01

## 2023-11-29 RX ORDER — INFLUENZA VIRUS VACCINE 15; 15; 15; 15 UG/.5ML; UG/.5ML; UG/.5ML; UG/.5ML
0.5 SUSPENSION INTRAMUSCULAR ONCE
Refills: 0 | Status: DISCONTINUED | OUTPATIENT
Start: 2023-11-29 | End: 2023-12-01

## 2023-11-29 RX ORDER — METHIMAZOLE 10 MG/1
1 TABLET ORAL
Refills: 0 | DISCHARGE

## 2023-11-29 RX ORDER — HYDROMORPHONE HYDROCHLORIDE 2 MG/ML
0.5 INJECTION INTRAMUSCULAR; INTRAVENOUS; SUBCUTANEOUS ONCE
Refills: 0 | Status: DISCONTINUED | OUTPATIENT
Start: 2023-11-29 | End: 2023-11-29

## 2023-11-29 RX ORDER — METOPROLOL TARTRATE 50 MG
0.5 TABLET ORAL
Refills: 0 | DISCHARGE

## 2023-11-29 RX ORDER — ENOXAPARIN SODIUM 100 MG/ML
40 INJECTION SUBCUTANEOUS EVERY 24 HOURS
Refills: 0 | Status: DISCONTINUED | OUTPATIENT
Start: 2023-11-29 | End: 2023-12-01

## 2023-11-29 RX ADMIN — SODIUM CHLORIDE 100 MILLILITER(S): 9 INJECTION, SOLUTION INTRAVENOUS at 05:29

## 2023-11-29 RX ADMIN — Medication 25 GRAM(S): at 05:29

## 2023-11-29 RX ADMIN — FENTANYL CITRATE 50 MICROGRAM(S): 50 INJECTION INTRAVENOUS at 16:00

## 2023-11-29 RX ADMIN — FENTANYL CITRATE 25 MICROGRAM(S): 50 INJECTION INTRAVENOUS at 16:56

## 2023-11-29 RX ADMIN — SODIUM CHLORIDE 100 MILLILITER(S): 9 INJECTION, SOLUTION INTRAVENOUS at 11:24

## 2023-11-29 RX ADMIN — ONDANSETRON 4 MILLIGRAM(S): 8 TABLET, FILM COATED ORAL at 23:46

## 2023-11-29 RX ADMIN — FENTANYL CITRATE 50 MICROGRAM(S): 50 INJECTION INTRAVENOUS at 16:30

## 2023-11-29 RX ADMIN — OXYCODONE HYDROCHLORIDE 5 MILLIGRAM(S): 5 TABLET ORAL at 16:55

## 2023-11-29 RX ADMIN — ENOXAPARIN SODIUM 40 MILLIGRAM(S): 100 INJECTION SUBCUTANEOUS at 23:21

## 2023-11-29 RX ADMIN — Medication 975 MILLIGRAM(S): at 19:46

## 2023-11-29 RX ADMIN — ONDANSETRON 4 MILLIGRAM(S): 8 TABLET, FILM COATED ORAL at 19:46

## 2023-11-29 RX ADMIN — HYDROMORPHONE HYDROCHLORIDE 0.5 MILLIGRAM(S): 2 INJECTION INTRAMUSCULAR; INTRAVENOUS; SUBCUTANEOUS at 18:23

## 2023-11-29 RX ADMIN — Medication 12.5 MILLIGRAM(S): at 10:26

## 2023-11-29 RX ADMIN — Medication 400 MILLIGRAM(S): at 11:24

## 2023-11-29 RX ADMIN — POTASSIUM PHOSPHATE, MONOBASIC POTASSIUM PHOSPHATE, DIBASIC 62.5 MILLIMOLE(S): 236; 224 INJECTION, SOLUTION INTRAVENOUS at 10:24

## 2023-11-29 RX ADMIN — ONDANSETRON 4 MILLIGRAM(S): 8 TABLET, FILM COATED ORAL at 16:18

## 2023-11-29 RX ADMIN — Medication 975 MILLIGRAM(S): at 20:16

## 2023-11-29 RX ADMIN — FENTANYL CITRATE 25 MICROGRAM(S): 50 INJECTION INTRAVENOUS at 16:23

## 2023-11-29 RX ADMIN — OXYCODONE HYDROCHLORIDE 5 MILLIGRAM(S): 5 TABLET ORAL at 17:25

## 2023-11-29 NOTE — DISCHARGE NOTE PROVIDER - NSDCCPCAREPLAN_GEN_ALL_CORE_FT
PRINCIPAL DISCHARGE DIAGNOSIS  Diagnosis: Biliary colic  Assessment and Plan of Treatment:      PRINCIPAL DISCHARGE DIAGNOSIS  Diagnosis: Biliary colic  Assessment and Plan of Treatment: WOUND CARE: You may shower. Do not scrub incision. Pat Dry abdomen. Leave the white steri strips in place, they will fall off on their own in approximately 5-7 days.   BATHING: Please do not submerge wound underwater. You may shower and/or sponge bathe.  ACTIVITY: No heavy lifting anything more than 10-15lbs or straining. Otherwise, you may return to your usual level of physical activity. If you are taking narcotic pain medication (such as Percocet), do NOT drive a car, operate machinery or make important decisions.  PAIN: A prescription for oxycodone has been sent to the pharmacy. You should only take these for severe pain. For mild or moderate pain, you may take 975mg of tylenol every 6 hours. Do not exceed more than 4G per day. Please take a stool softener (senna, colace, or miralax) while taking narcotic pain medication to avoid opioid-induced constipation.   NOTIFY YOUR SURGEON IF: You have any bleeding that does not stop, any pus draining from your wound, any fever (over 100.4 F) or chills, persistent nausea/vomiting with inability to tolerate food or liquids, persistent diarrhea, or if your pain is not controlled on your discharge pain medications.  FOLLOW-UP:  1. Please call to make a follow-up appointment within one week of discharge with Dr. Buckley.  2. Please follow up with your primary care physician in one week regarding your hospitalization.     PRINCIPAL DISCHARGE DIAGNOSIS  Diagnosis: Biliary colic  Assessment and Plan of Treatment: WOUND CARE: You may shower. Do not scrub incision. Pat Dry abdomen. Leave the white steri strips in place, they will fall off on their own in approximately 5-7 days.   BATHING: Please do not submerge wound underwater. You may shower and/or sponge bathe.  ACTIVITY: No heavy lifting anything more than 10-15lbs or straining. Otherwise, you may return to your usual level of physical activity. If you are taking narcotic pain medication (such as Percocet), do NOT drive a car, operate machinery or make important decisions.  PAIN: A prescription for oxycodone has been sent to the pharmacy. You should only take these for severe pain. For mild or moderate pain, you may take 975mg of tylenol every 6 hours. Do not exceed more than 4G per day. Please take a stool softener (senna, colace, or miralax) while taking narcotic pain medication to avoid opioid-induced constipation.   NOTIFY YOUR SURGEON IF: You have any bleeding that does not stop, any pus draining from your wound, any fever (over 100.4 F) or chills, persistent nausea/vomiting with inability to tolerate food or liquids, persistent diarrhea, or if your pain is not controlled on your discharge pain medications.  FOLLOW-UP:  1. Please call to make a follow-up appointment within one week of discharge with Dr. Buckley.  2. Please follow up with your primary care physician in one week regarding your hospitalization.      SECONDARY DISCHARGE DIAGNOSES  Diagnosis: Dizziness  Assessment and Plan of Treatment: The neurology team had evaluated while in the hospital. Your dizziness/headaches were likely related to post-operation. You were given intravenous fluids, meclizine, and a "migraine cocktail" with improvement  Please follow up with neurology in 1-2 weeks after discharge from the hospital

## 2023-11-29 NOTE — PRE-ANESTHESIA EVALUATION ADULT - BP NONINVASIVE MEAN (MM HG)
[address] Counseling: I discussed with the patient the risks of tralokinumab including but not limited to eye infection and irritation, cold sores, injection site reactions, worsening of asthma, allergic reactions and increased risk of parasitic infection.  Live vaccines should be avoided while taking tralokinumab. The patient understands that monitoring is required and they must alert us or the primary physician if symptoms of infection or other concerning signs are noted.

## 2023-11-29 NOTE — BRIEF OPERATIVE NOTE - OPERATION/FINDINGS
critical view of safety achieved: cystic duct, cystic artery and posterior branch identified, clipped and divided.

## 2023-11-29 NOTE — PROGRESS NOTE ADULT - SUBJECTIVE AND OBJECTIVE BOX
DATE OF SERVICE: 11-29-23 @ 14:36    Patient is a 43y old  Female who presents with a chief complaint of abdominal pain (29 Nov 2023 10:53)      INTERVAL HISTORY: Feels ok.     REVIEW OF SYSTEMS:  CONSTITUTIONAL: No weakness  EYES/ENT: No visual changes;  No throat pain   NECK: No pain or stiffness  RESPIRATORY: No cough, wheezing; No shortness of breath  CARDIOVASCULAR: No chest pain or palpitations  GASTROINTESTINAL: No abdominal  pain. No nausea, vomiting, or hematemesis  GENITOURINARY: No dysuria, frequency or hematuria  NEUROLOGICAL: No stroke like symptoms  SKIN: No rashes    	  MEDICATIONS:        PHYSICAL EXAM:  T(C): 37.4 (11-29-23 @ 14:32), Max: 37.4 (11-29-23 @ 13:17)  HR: 60 (11-29-23 @ 14:32) (60 - 78)  BP: 103/66 (11-29-23 @ 14:32) (93/58 - 135/89)  RR: 16 (11-29-23 @ 14:32) (16 - 18)  SpO2: 99% (11-29-23 @ 14:32) (98% - 100%)  Wt(kg): --  I&O's Summary    28 Nov 2023 07:01  -  29 Nov 2023 07:00  --------------------------------------------------------  IN: 1180 mL / OUT: 600 mL / NET: 580 mL    29 Nov 2023 07:01  -  29 Nov 2023 14:36  --------------------------------------------------------  IN: 0 mL / OUT: 0 mL / NET: 0 mL      Height (cm): 149.9 (11-29 @ 14:32)  Weight (kg): 61.2 (11-29 @ 14:32)  BMI (kg/m2): 27.2 (11-29 @ 14:32)  BSA (m2): 1.56 (11-29 @ 14:32)    Appearance: In no distress	  HEENT:    PERRL, EOMI	  Cardiovascular:  S1 S2, No JVD  Respiratory: Lungs clear to auscultation	  Gastrointestinal:  Soft, Non-tender, + BS	  Vascularature:  No edema of LE  Psychiatric: Appropriate affect   Neuro: no acute focal deficits                               11.9   6.13  )-----------( 240      ( 29 Nov 2023 04:30 )             36.1     11-29    136  |  102  |  6<L>  ----------------------------<  85  3.6   |  26  |  0.62    Ca    8.9      29 Nov 2023 04:30  Phos  2.6     11-29  Mg     1.9     11-29    TPro  6.2  /  Alb  3.8  /  TBili  0.3  /  DBili  x   /  AST  90<H>  /  ALT  193<H>  /  AlkPhos  103  11-29        Labs personally reviewed      ASSESSMENT/PLAN: 	    42 yo F with PMHx of Graves disease with no prior surgical history presents to the ED due to intermittent epigastric pain for the past 3 days. U/S and CT imaging with cholelithiasis. Overall picture equivocal for acute cholecystitis. HIDA scan negative for acute cholecystitis or biliary obstruction.     1. Acute Cholecystitis  - CT A/P shows probable cholelithiasis  - HIDA scan negative  - PO trial overnight with patient reporting increased pain again  - As per surgery, will plan for lap charlene today    2. Palpitations  - c/w home metoprolol 12.5mg PO BID with hold parameters while inpatient    3. Cardiac Risk Stratification  - ECG NSR  - Reports excellent functional capacity  - No hx of severe AS/MS. No appreciable murmur on exam.  - Patient is low risk for mod risk surgery. No contraindication to proceed.         Sabina Salazar, SANDRA-NP   Cristofer Han DO [facility]  Cardiovascular Medicine  [address], [address]  Available through call or text on [employer] TE[employer]  Office: [phone]

## 2023-11-29 NOTE — PATIENT PROFILE ADULT - FALL HARM RISK - UNIVERSAL INTERVENTIONS
Bed in lowest position, wheels locked, appropriate side rails in place/Call bell, personal items and telephone in reach/Instruct patient to call for assistance before getting out of bed or chair/Non-slip footwear when patient is out of bed/[place] to call system/Physically safe environment - no spills, clutter or unnecessary equipment/Purposeful Proactive Rounding/Room/bathroom lighting operational, light cord in reach

## 2023-11-29 NOTE — PROGRESS NOTE ADULT - ASSESSMENT
42 yo F with PMHx of Graves disease with no prior surgical history presents to the ED due to intermittent epigastric pain for the past 3 days. U/S and CT imaging with cholelithiasis. Surgery consulted to evaluate. Overall picture equivocal for acute cholecystitis. HIDA scan negative for acute cholecystitis or biliary obstruction.     Plan:   - Plan for OR tomorrow for laparoscopic cholecystectomy  - NPO/IVF  - f/u am labs    [facility]  [id number] 44 yo F with PMHx of Graves disease with no prior surgical history presents to the ED due to intermittent epigastric pain for the past 3 days. U/S and CT imaging with cholelithiasis. Surgery consulted to evaluate. Overall picture equivocal for acute cholecystitis. HIDA scan negative for acute cholecystitis or biliary obstruction. c/f biliary colic.    Plan:   - Plan for OR tomorrow for laparoscopic cholecystectomy  - NPO/IVF  - f/u am labs    [facility]  [id number] 44 yo F with PMHx of Graves disease with no prior surgical history presents to the ED due to intermittent epigastric pain for the past 3 days. U/S and CT imaging with cholelithiasis. Surgery consulted to evaluate. Overall picture equivocal for acute cholecystitis. HIDA scan negative for acute cholecystitis or biliary obstruction. c/f biliary colic.    Plan:   - Plan for OR today for laparoscopic cholecystectomy  - NPO/IVF  - f/u am labs    [facility]  [id number]

## 2023-11-29 NOTE — DISCHARGE NOTE PROVIDER - HOSPITAL COURSE
44 yo F w/ PMHx of Graves disease with no PSHx presented to [facility] ED w/ 3 days of intermittent epigastric pain worse after eating associate with nausea and vomiting found to have biliary colic. Patient failed PO challenge due to persistent pain and was admitted for gallbladder removal.     Patient was evaluated by GI on admission for evaluation of post-prandial pain. She underwent EGD 11/28 found to have small patch of eroded erythematous mucosa (gastric cardia, biopsied), small sliding hiatal hernia but no findings to explain patient's symptoms.     On 11/29, patient was taken to the operating room for laparoscopic cholecystectomy. Post-operatively, patient was transferred to PACU in stable condition and deemed stable for discharge home.     At the time of discharge, the patient was hemodynamically stable, was tolerating PO diet, was voiding urine and passing stool, was ambulating, and was comfortable with adequate pain control. The patient was instructed to follow up with Dr. Buckley within 2 weeks after discharge from the hospital. The patient/family felt comfortable with discharge. The patient was discharged to home. The patient had no other issues. She will also f/u with GI Dr. Osborn within 2 weeks.    44 yo F w/ PMHx of Graves disease with no PSHx presented to [facility] ED w/ 3 days of intermittent epigastric pain worse after eating associate with nausea and vomiting found to have biliary colic. Patient failed PO challenge due to persistent pain and was admitted for gallbladder removal.     Patient was evaluated by GI on admission for evaluation of post-prandial pain. She underwent EGD 11/28 found to have small patch of eroded erythematous mucosa (gastric cardia, biopsied), small sliding hiatal hernia but no findings to explain patient's symptoms.     On 11/29, patient was taken to the operating room for laparoscopic cholecystectomy. Post-operatively, patient was transferred to PACU in stable condition. On POC, patient's vital signs were stable and pain was controlled. Patient was transferred to surgical floor in stable condition. Overnight, patient was nauseous and vomit     At the time of discharge, the patient was hemodynamically stable, was tolerating PO diet, was voiding urine and passing stool, was ambulating, and was comfortable with adequate pain control. The patient was instructed to follow up with Dr. Buckley within 2 weeks after discharge from the hospital. The patient/family felt comfortable with discharge. The patient was discharged to home. The patient had no other issues. She will also f/u with GI Dr. Osborn within 2 weeks.    42 yo F w/ PMHx of Graves disease with no PSHx presented to [facility] ED w/ 3 days of intermittent epigastric pain worse after eating associate with nausea and vomiting found to have biliary colic. Patient failed PO challenge due to persistent pain and was admitted for gallbladder removal.     Patient was evaluated by GI on admission for evaluation of post-prandial pain. She underwent EGD 11/28 found to have small patch of eroded erythematous mucosa (gastric cardia, biopsied), small sliding hiatal hernia but no findings to explain patient's symptoms.     On 11/29, patient was taken to the operating room for laparoscopic cholecystectomy. Post-operatively, patient was transferred to PACU in stable condition. On POC, patient's vital signs were stable and pain was controlled.   Patient was transferred to surgical floor in stable condition. Overnight, patient was nauseous and had 2 episodes of bilious emesis, which improved s/p IV Zofran and Reglan. AXR (11/30) showed no definite bowel obstruction or ileus.   On POD #1, patient reported improved nausea with no more further episodes of vomiting. However, patient complained of dizziness/headaches when laying down in bed. Orthostatics were negative. Neurology was consulted. As per neurology, dizziness/headaches likely related to post-op, and recommended IVF, meclizine PRN, and migraines cocktail (toradol, reglan, benadryl, and magnesium).  On POD #2, ***    At the time of discharge, the patient was hemodynamically stable, was tolerating PO diet, was voiding urine and passing stool, was ambulating, and was comfortable with adequate pain control. The patient was instructed to follow up with Dr. Buckley within 2 weeks after discharge from the hospital. The patient/family felt comfortable with discharge. The patient was discharged to home. The patient had no other issues. She will also f/u with GI (Dr. Osborn) and neurology (Dr. Jarad Floyd) within 2 weeks.    44 yo F w/ PMHx of Graves disease with no PSHx presented to [facility] ED w/ 3 days of intermittent epigastric pain worse after eating associate with nausea and vomiting found to have biliary colic. Patient failed PO challenge due to persistent pain and was admitted for gallbladder removal.     Patient was evaluated by GI on admission for evaluation of post-prandial pain. She underwent EGD 11/28 found to have small patch of eroded erythematous mucosa (gastric cardia, biopsied), small sliding hiatal hernia but no findings to explain patient's symptoms.     On 11/29, patient was taken to the operating room for laparoscopic cholecystectomy. Post-operatively, patient was transferred to PACU in stable condition. On POC, patient's vital signs were stable and pain was controlled.   Patient was transferred to surgical floor in stable condition. Overnight, patient was nauseous and had 2 episodes of bilious emesis, which improved s/p IV Zofran and Reglan. AXR (11/30) showed no definite bowel obstruction or ileus.   On POD #1, patient reported improved nausea with no more further episodes of vomiting. However, patient complained of dizziness/headaches when laying down in bed. Orthostatics were negative. Neurology was consulted. As per neurology, dizziness/headaches likely related to post-op, and recommended IVF, meclizine PRN, and migraines cocktail (toradol, reglan, benadryl, and magnesium).  On POD #2, patient was tolerating PO diet, was voiding urine and passing stool, was ambulating, and was comfortable with adequate pain control. Her nausea and headaches were improved and she did not report any headaches.     At the time of discharge, the patient was hemodynamically stable, was tolerating PO diet, was voiding urine and passing stool, was ambulating, and was comfortable with adequate pain control. The patient was instructed to follow up with Dr. Buckley within 2 weeks after discharge from the hospital. The patient/family felt comfortable with discharge. The patient was discharged to home. The patient had no other issues. She will also f/u with GI (Dr. Osborn) and neurology (Dr. Jarad Floyd) within 2 weeks.

## 2023-11-29 NOTE — PROGRESS NOTE ADULT - SUBJECTIVE AND OBJECTIVE BOX
GREEN TEAM SURGERY DAILY PROGRESS NOTE    SUBJECTIVE: No acute events overnight. Patient seen and examined this am.     OBJECTIVE:  Vital Signs Last 24 Hrs  T(C): 37.1 (28 Nov 2023 21:33), Max: 37.1 (28 Nov 2023 21:33)  T(F): 98.8 (28 Nov 2023 21:33), Max: 98.8 (28 Nov 2023 21:33)  HR: 68 (28 Nov 2023 21:33) (61 - 89)  BP: 127/75 (28 Nov 2023 21:33) (98/54 - 135/89)  BP(mean): 97 (28 Nov 2023 17:11) (97 - 97)  RR: 18 (28 Nov 2023 21:33) (16 - 22)  SpO2: 99% (28 Nov 2023 21:33) (97% - 100%)    Parameters below as of 28 Nov 2023 21:33  Patient On (Oxygen Delivery Method): room air    Daily Height in cm: 149.86 (28 Nov 2023 12:26)      STANDING  enoxaparin Injectable 40 milliGRAM(s) SubCutaneous every 24 hours  ibuprofen  Tablet. 400 milliGRAM(s) Oral once  lactated ringers. 1000 milliLiter(s) (100 mL/Hr) IV Continuous <Continuous>  methimazole 15 milliGRAM(s) Oral daily  metoprolol tartrate 12.5 milliGRAM(s) Oral two times a day    PRN  ondansetron Injectable 4 milliGRAM(s) IV Push every 6 hours PRN Nausea and/or Vomiting      Labs:  141  |  106  |  8  ----------------------------<  79    (11-28)  3.6   |  24  |  0.59          Ca    8.8      11-28  Mg    x  Phos  x          Urinalysis Basic - ( 28 Nov 2023 07:32 )    Color: x / Appearance: x / SG: x / pH: x  Gluc: 79 mg/dL / Ketone: x  / Bili: x / Urobili: x   Blood: x / Protein: x / Nitrite: x   Leuk Esterase: x / RBC: x / WBC x   Sq Epi: x / Non Sq Epi: x / Bacteria: x      Physical Exam:  General: NAD  Respiratory: respirations non labored  Abdominal: +Mild epigastric tenderness to palpation, soft, NT/ND, no rebound/guarding, no masses palpated, negative Trinidad's sign  Extremities: FROM, warm  Neurological: A+Ox3    GREEN TEAM SURGERY DAILY PROGRESS NOTE    SUBJECTIVE: No acute events overnight. Patient seen and examined this morning.      OBJECTIVE:  Vital Signs Last 24 Hrs  T(C): 37.1 (28 Nov 2023 21:33), Max: 37.1 (28 Nov 2023 21:33)  T(F): 98.8 (28 Nov 2023 21:33), Max: 98.8 (28 Nov 2023 21:33)  HR: 68 (28 Nov 2023 21:33) (61 - 89)  BP: 127/75 (28 Nov 2023 21:33) (98/54 - 135/89)  BP(mean): 97 (28 Nov 2023 17:11) (97 - 97)  RR: 18 (28 Nov 2023 21:33) (16 - 22)  SpO2: 99% (28 Nov 2023 21:33) (97% - 100%)    Parameters below as of 28 Nov 2023 21:33  Patient On (Oxygen Delivery Method): room air    Daily Height in cm: 149.86 (28 Nov 2023 12:26)      STANDING  enoxaparin Injectable 40 milliGRAM(s) SubCutaneous every 24 hours  ibuprofen  Tablet. 400 milliGRAM(s) Oral once  lactated ringers. 1000 milliLiter(s) (100 mL/Hr) IV Continuous <Continuous>  methimazole 15 milliGRAM(s) Oral daily  metoprolol tartrate 12.5 milliGRAM(s) Oral two times a day    PRN  ondansetron Injectable 4 milliGRAM(s) IV Push every 6 hours PRN Nausea and/or Vomiting      Labs:  141  |  106  |  8  ----------------------------<  79    (11-28)  3.6   |  24  |  0.59          Ca    8.8      11-28  Mg    x  Phos  x          Urinalysis Basic - ( 28 Nov 2023 07:32 )    Color: x / Appearance: x / SG: x / pH: x  Gluc: 79 mg/dL / Ketone: x  / Bili: x / Urobili: x   Blood: x / Protein: x / Nitrite: x   Leuk Esterase: x / RBC: x / WBC x   Sq Epi: x / Non Sq Epi: x / Bacteria: x      Physical Exam:  General: NAD  Respiratory: respirations non labored  Abdominal: +Mild epigastric tenderness to palpation, soft, NT/ND, no rebound/guarding, no masses palpated, negative Trinidad's sign  Extremities: FROM, warm  Neurological: A+Ox3

## 2023-11-29 NOTE — PROGRESS NOTE ADULT - SUBJECTIVE AND OBJECTIVE BOX
INTERVAL HPI/OVERNIGHT EVENTS:  sp EGD yesterday  no vomiting    +post-prandial pain yesterday  +colicky epigastric/upper abdominal pain  planned lap charlene today  no fever or chills  no CP or SOB    < from: Upper Endoscopy (11.28.23 @ 12:27) >  [facility]  ____________________________________________________________________________________________________  Patient Name: Jessy Landa                    MRN: [id number]  Account Number: [id number]                     YOB: 1980  Room: Endoscopy Room 3                           Gender: Female  Attending MD: Isis Singh ,                 Procedure Date No Time: 11/28/2023  ____________________________________________________________________________________________________     Procedure:           Upper GI endoscopy  Indications:         Epigastric abdominal pain, Dysphagia  Providers:           Isis Singh  Medicines:           Monitored Anesthesia Care  Complications:       No immediate complications.  ____________________________________________________________________________________________________                                                                                                          Findings:       The upper third of the esophagus and middle third of the esophagus were normal.       Diffuse mild mucosal changes characterized bya decreased vascular pattern were found in the        lower third of the esophagus. Biopsies were obtained from the proximal and distal esophagus        with cold forceps for histology of suspected eosinophilic esophagitis given report of        dysphagia and mild mucosal abnormality.       The Z-line was regular and was found 36 cm from the incisors.       A 2 cm sliding hiatal hernia was present.       The gastroesophageal flap valve was visualized endoscopically and classified as Hill Grade IV       (no fold, wide open lumen, hiatal hernia present).       Localized mild mucosal changes characterized by erythema and erosion were found in the        cardia. This may have been representative of a Lior's lesion as it was part of the sliding        hiatal hernia. Biopsies were taken with a cold forceps for histology.       The exam of the stomach was otherwise normal.       Biopsies were taken with a cold forceps in the gastric antrum for histology.       The duodenal bulb, first portion of the duodenum, second portion of the duodenum and third        portion of the duodenum were normal.                                                                                                        Impression:          - There were no findings on this exam to explain her significant abdominal                        discomfort and tenderness to palpation.                       - Normal upper third of esophagus and middle third of esophagus.                       - Decreased vascular pattern mucosa in the esophagus. Biopsied.                       - Z-line regular, 36 cm from the incisors.                       - 2 cm hiatal hernia.                       - Gastroesophageal flap valve classified as Hill Grade IV (no fold, wide open                    lumen, hiatal hernia present).                       - Erythematous and eroded mucosa in the cardia. Biopsied.                       - Normal duodenal bulb, first portion of the duodenum, second portion of the                        duodenum and third portion of the duodenum.                       - Biopsies were taken with a cold forceps for histology in the gastric antrum.  Recommendation:      - [facility] patient to [facility] for [facility] care.                       - Resume regular diet.                       - Await pathology results.        MEDICATIONS  (STANDING):  acetaminophen   IVPB .. 1000 milliGRAM(s) IV Intermittent once  enoxaparin Injectable 40 milliGRAM(s) SubCutaneous every 24 hours  ibuprofen  Tablet. 400 milliGRAM(s) Oral once  lactated ringers. 1000 milliLiter(s) (100 mL/Hr) IV Continuous <Continuous>  methimazole 15 milliGRAM(s) Oral daily  metoprolol tartrate 12.5 milliGRAM(s) Oral two times a day    MEDICATIONS  (PRN):  ondansetron Injectable 4 milliGRAM(s) IV Push every 6 hours PRN Nausea and/or Vomiting      Allergies  sulfa drugs (Rash)      Review of Systems:  see HPI- remainder 10 point ROS negative      Vital Signs Last 24 Hrs  T(C): 36.6 (29 Nov 2023 08:58), Max: 37.1 (28 Nov 2023 21:33)  T(F): 97.8 (29 Nov 2023 08:58), Max: 98.8 (28 Nov 2023 21:33)  HR: 76 (29 Nov 2023 08:58) (61 - 80)  BP: 105/72 (29 Nov 2023 08:58) (93/58 - 135/89)  BP(mean): 97 (28 Nov 2023 17:11) (97 - 97)  RR: 18 (29 Nov 2023 08:58) (16 - 22)  SpO2: 98% (29 Nov 2023 08:58) (98% - 100%)    Parameters below as of 29 Nov 2023 08:58  Patient On (Oxygen Delivery Method): room air    PHYSICAL EXAM:  Constitutional: NAD, well-developed non toxic appearing female sitting up in bed  Neck: No LAD, supple no JVD  Respiratory: bl air entry, no increased WOB  Cardiovascular: S1 and S2, regular  Gastrointestinal: BS+, soft, ND +epigastric /upper abdominal tenderness without rebound or rigidity negative Trinidad's sign  Extremities: No peripheral edema, neg clubbing, cyanosis  Vascular: 2+ peripheral pulses  Neurological: A/O x 3, no focal deficits  Psychiatric: Normal mood, normal affect  Skin: No rashes, anicteric    LABS:                        11.9   6.13  )-----------( 240      ( 29 Nov 2023 04:30 )             36.1     11-29    136  |  102  |  6<L>  ----------------------------<  85  3.6   |  26  |  0.62    Ca    8.9      29 Nov 2023 04:30  Phos  2.6     11-29  Mg     1.9     11-29    TPro  6.2  /  Alb  3.8  /  TBili  0.3  /  DBili  x   /  AST  90<H>  /  ALT  193<H>  /  AlkPhos  103  11-29    Alkaline Phosphatase: 103 U/L (11.29.23 @ 04:30)   Alkaline Phosphatase: 96 U/L (11.28.23 @ 07:32)   Alkaline Phosphatase: 105 U/L (11.27.23 @ 13:06)     Aspartate Aminotransferase (AST/SGOT): 90 U/L (11.29.23 @ 04:30)   Aspartate Aminotransferase (AST/SGOT): 108 U/L (11.28.23 @ 07:32)   Aspartate Aminotransferase (AST/SGOT): 93 U/L (11.27.23 @ 13:06)     Alanine Aminotransferase (ALT/SGPT): 193 U/L (11.29.23 @ 04:30)   Alanine Aminotransferase (ALT/SGPT): 181 U/L (11.28.23 @ 07:32)   Alanine Aminotransferase (ALT/SGPT): 181 U/L (11.27.23 @ 13:06)       PT/INR - ( 29 Nov 2023 04:30 )   PT: 11.9 sec;   INR: 1.14 ratio         PTT - ( 29 Nov 2023 04:30 )  PTT:34.4 sec          RADIOLOGY & ADDITIONAL TESTS:

## 2023-11-29 NOTE — DISCHARGE NOTE PROVIDER - NSDCACTIVITY_GEN_ALL_CORE
No heavy lifting/straining Do not drive or operate machinery/Do not make important decisions/Stairs allowed/Walking - Indoors allowed/No heavy lifting/straining/Walking - Outdoors allowed/Follow Instructions Provided by your Surgical Team

## 2023-11-29 NOTE — DISCHARGE NOTE PROVIDER - NSDCFUADDINST_GEN_ALL_CORE_FT
WOUND CARE:   BATHING: Please do not submerge wound underwater. Do not take a bath. You may shower and/or sponge bathe.    ACTIVITY: No heavy lifting or straining; do not lift anything greater than 10 pounds. Otherwise, you may return to your usual level of physical activity. If you are taking narcotic pain medication (such as Percocet), do NOT drive a car, operate machinery or make important decisions.    DIET: Return to your usual diet.    NOTIFY YOUR SURGEON IF: You have any bleeding that does not stop, any pus draining from your wound, any fever (over 100.4 F) or chills, persistent nausea/vomiting, persistent diarrhea, or if your pain is not controlled on your discharge pain medications.    FOLLOW-UP:  1. Please call to make a follow-up appointment within 2 weeks of discharge with Dr. Buckley   2. Please follow up with your primary care physician in one week regarding your hospitalization.   WOUND CARE:   PAIN CONTROL: Take Tylenol over the counter for mild to moderate pain. A prescription for oxycodone has been sent to your pharmacy. You may take 1 tablet every 6 hours as needed for severe pain.     BATHING: Please do not submerge wound underwater. Do not take a bath. You may shower and/or sponge bathe.    ACTIVITY: No heavy lifting or straining; do not lift anything greater than 10 pounds. Otherwise, you may return to your usual level of physical activity. If you are taking narcotic pain medication (such as Percocet), do NOT drive a car, operate machinery or make important decisions.    DIET: Low fat diet    NOTIFY YOUR SURGEON IF: You have any bleeding that does not stop, any pus draining from your wound, any fever (over 100.4 F) or chills, persistent nausea/vomiting, persistent diarrhea, or if your pain is not controlled on your discharge pain medications.    FOLLOW-UP:  1. Please call to make a follow-up appointment within 2 weeks of discharge with Dr. Buckley   2. Please follow up with your primary care physician in one week regarding your hospitalization.

## 2023-11-29 NOTE — CHART NOTE - NSCHARTNOTEFT_GEN_A_CORE
SURGERY POST-OPERATIVE PROCEDURE NOTE    PROCEDURE: laparoscopic cholecystectomy    SUBJECTIVE:  Patient seen and evaluated at the bedside. Resting in bed,  at bedside. Patient endorsed 1 episode of 100cc, bilious vomiting at 7:30 pm 2/2 nausea that resolved with zofran. States that she is hesitant to drink or eat due to the nausea.    SOB:  [ ] YES [x] NO  Chest Discomfort: [ ] YES [x] NO    Nausea: [x] YES [ ] NO           Vomiting: [x] YES [ ] NO  Flatus: [ ] YES [x] NO             Bowel Movement: [ ] YES [x] NO  Void: [x]YES [ ]No         Pain Control Adequate: [x] YES [ ] NO    OBJECTIVE:   Vital Signs Last 24 Hrs  T(C): 36.7 (29 Nov 2023 21:17), Max: 37.4 (29 Nov 2023 13:17)  T(F): 98.1 (29 Nov 2023 21:17), Max: 99.3 (29 Nov 2023 13:17)  HR: 73 (29 Nov 2023 21:17) (60 - 83)  BP: 125/77 (29 Nov 2023 21:17) (93/58 - 146/70)  BP(mean): 86 (29 Nov 2023 17:30) (86 - 101)  RR: 18 (29 Nov 2023 21:17) (16 - 18)  SpO2: 96% (29 Nov 2023 21:17) (93% - 100%)    Parameters below as of 29 Nov 2023 21:17  Patient On (Oxygen Delivery Method): room air      I&O's Summary    28 Nov 2023 07:01  -  29 Nov 2023 07:00  --------------------------------------------------------  IN: 1180 mL / OUT: 600 mL / NET: 580 mL    29 Nov 2023 07:01  -  29 Nov 2023 22:34  --------------------------------------------------------  IN: 120 mL / OUT: 600 mL / NET: -480 mL      I&O's Detail    28 Nov 2023 07:01  -  29 Nov 2023 07:00  --------------------------------------------------------  IN:    Lactated Ringers: 700 mL    Oral Fluid: 480 mL  Total IN: 1180 mL    OUT:    Blood Loss (mL): 0 mL    Voided (mL): 600 mL  Total OUT: 600 mL    Total NET: 580 mL      29 Nov 2023 07:01  -  29 Nov 2023 22:34  --------------------------------------------------------  IN:    Oral Fluid: 120 mL  Total IN: 120 mL    OUT:    Voided (mL): 600 mL  Total OUT: 600 mL    Total NET: -480 mL            Labs:                        11.9   6.13  )-----------( 240      ( 29 Nov 2023 04:30 )             36.1     11-29    136  |  102  |  6<L>  ----------------------------<  85  3.6   |  26  |  0.62    Ca    8.9      29 Nov 2023 04:30  Phos  2.6     11-29  Mg     1.9     11-29    TPro  6.2  /  Alb  3.8  /  TBili  0.3  /  DBili  x   /  AST  90<H>  /  ALT  193<H>  /  AlkPhos  103  11-29    PT/INR - ( 29 Nov 2023 04:30 )   PT: 11.9 sec;   INR: 1.14 ratio         PTT - ( 29 Nov 2023 04:30 )  PTT:34.4 sec  Urinalysis Basic - ( 29 Nov 2023 04:30 )    Color: x / Appearance: x / SG: x / pH: x  Gluc: 85 mg/dL / Ketone: x  / Bili: x / Urobili: x   Blood: x / Protein: x / Nitrite: x   Leuk Esterase: x / RBC: x / WBC x   Sq Epi: x / Non Sq Epi: x / Bacteria: x        MEDICATIONS  (STANDING):  acetaminophen     Tablet .. 975 milliGRAM(s) Oral every 6 hours  enoxaparin Injectable 40 milliGRAM(s) SubCutaneous every 24 hours  ibuprofen  Tablet. 400 milliGRAM(s) Oral once  influenza   Vaccine 0.5 milliLiter(s) IntraMuscular once  lactated ringers. 1000 milliLiter(s) (100 mL/Hr) IV Continuous <Continuous>    MEDICATIONS  (PRN):  ondansetron    Tablet 4 milliGRAM(s) Oral every 6 hours PRN Nausea and/or Vomiting  oxyCODONE    IR 2.5 milliGRAM(s) Oral every 4 hours PRN Moderate Pain (4 - 6)  oxyCODONE    IR 5 milliGRAM(s) Oral every 4 hours PRN Severe Pain (7 - 10)        Physical Exam:  General: well developed, well nourished, NAD  Cardiovascular: appears well perfused  Respiratory: respirations non labored  Gastrointestinal: soft, nontender, nondistended. port site incisionsx4 c/d/i.  Extremities: FROM, warm  Neurological: A+Ox3    ASSESSMENT AND PLAN:  ASSESSMENT:  43y Female s/p Laparoscopic cholecystectomy. Post-operatively endorsed 1 episode of bilious vomiting induced by nausea and poor PO intake.    PLAN:   - Diet: Regular (Low Fat, Kosher), encourage PO intake  - IVF:   - Activity- OOB with assistance  - Pain medication as needed   - Incentive spirometry   - DVT PPX: LOV  - Dispo: PACU to floor    [facility]  [id number]

## 2023-11-29 NOTE — PROGRESS NOTE ADULT - ASSESSMENT
42 yo F with PMHx of Graves disease (recently diagnosed) with no prior surgical history presents to the ED due to intermittent epigastric pain since Sunday, Reported substernal burning and dyspepsia with sensation of food "going down slowly" followed by upper abdominal pain that radiated to back. no fever or chills, no rash. Pain worsened with eating, minimal improvement after IV Pepcid.     Mild transaminitis without hyperbilirubinemia and normal lipase    CT AP: +liver cyst, ?gallstones  US: GB polyp vs stone. no US evidence of ac cholecystitis  HIDA negative  HCG - negative    #post-prandial epigastric pain , suspect 2/2 biliary colic; no findings on EGD 11/28 to explain pt's symptoms  11/28 EGD: small patch of eroded erythematous mucosa (gastric cardia, biopsied), small sliding HH.  Esophageal and antral biopsies also obtained. normal duodenum    -lap charlene as per Surgery  -can follow up biopsy results as outpt with Dr AVA Osborn in 2 weeks  -will follow with you post op    Toby Medeiros PA-C  [facility] Teaching GI Service  [facility]  [phone]  Available on TEAMS Mon-Fri 8a-4p  After hours and weekend coverage [phone]

## 2023-11-29 NOTE — DISCHARGE NOTE PROVIDER - NSDCCPTREATMENT_GEN_ALL_CORE_FT
PRINCIPAL PROCEDURE  Procedure: Laparoscopic cholecystectomy  Findings and Treatment:       SECONDARY PROCEDURE  Procedure: EGD  Findings and Treatment: F/u with GI outpatient for biopsy results

## 2023-11-29 NOTE — DISCHARGE NOTE PROVIDER - NSDCFUSCHEDAPPT_GEN_ALL_CORE_FT
[facility]  ENDOCRIN [address] [facility] [address]  Scheduled Appointment: 12/14/2023

## 2023-11-29 NOTE — DISCHARGE NOTE PROVIDER - NSDCMRMEDTOKEN_GEN_ALL_CORE_FT
methIMAzole 15 mg oral tablet: 1 tab(s) orally once a day  metoprolol tartrate 25 mg oral tablet: 0.5 tab(s) orally 2 times a day   methIMAzole 15 mg oral tablet: 1 tab(s) orally once a day  metoprolol tartrate 25 mg oral tablet: 0.5 tab(s) orally 2 times a day  oxyCODONE 5 mg oral tablet: 1 tab(s) orally every 6 hours MDD: 4 tablets   acetaminophen 325 mg oral tablet: 3 tab(s) orally every 6 hours  methIMAzole 15 mg oral tablet: 1 tab(s) orally once a day  metoprolol tartrate 25 mg oral tablet: 0.5 tab(s) orally 2 times a day  oxyCODONE 5 mg oral tablet: 1 tab(s) orally every 6 hours MDD: 4 tablets   acetaminophen 325 mg oral tablet: 3 tab(s) orally every 6 hours  meclizine 25 mg oral tablet: 1 tab(s) orally 2 times a day as needed for Dizziness  meclizine 25 mg oral tablet: 1 tab(s) orally 2 times a day As needed Dizziness  methIMAzole 15 mg oral tablet: 1 tab(s) orally once a day  metoprolol tartrate 25 mg oral tablet: 0.5 tab(s) orally 2 times a day  oxyCODONE 5 mg oral tablet: 1 tab(s) orally every 6 hours MDD: 4 tablets

## 2023-11-29 NOTE — DISCHARGE NOTE PROVIDER - CARE PROVIDERS DIRECT ADDRESSES
,[email or web address]

## 2023-11-29 NOTE — DISCHARGE NOTE PROVIDER - PROVIDER TOKENS
PROVIDER:[TOKEN:[[id number]],FOLLOWUP:[2 weeks]],PROVIDER:[TOKEN:[[id number]],FOLLOWUP:[2 weeks]] PROVIDER:[TOKEN:[[id number]],FOLLOWUP:[1 week]],PROVIDER:[TOKEN:[[id number]],FOLLOWUP:[1 week]],PROVIDER:[TOKEN:[[id number]],FOLLOWUP:[1 week]]

## 2023-11-29 NOTE — DISCHARGE NOTE PROVIDER - CARE PROVIDER_API CALL
Maynor Buckley  Surgery  [address], [address]  [place] [address]  Phone: [phone]  Fax: [phone]  Follow Up Time: 2 weeks    Luis Antonio Osborn  Gastroenterology  [address], [address]  [place] [address]  Phone: [phone]  Fax: [phone]  Follow Up Time: 2 weeks   Maynor Buckley  Surgery  [address], [address]  [place] [address]  Phone: [phone]  Fax: [phone]  Follow Up Time: 1 week    Luis Antonio Osborn  Gastroenterology  [address], [address]  [place] [address]  Phone: [phone]  Fax: [phone]  Follow Up Time: 1 week    Jarad Floyd  Neurology  [address] [place] [address]  Phone: [phone]  Fax: [phone]  Follow Up Time: 1 week

## 2023-11-29 NOTE — PRE-ANESTHESIA EVALUATION ADULT - HEIGHT IN INCHES
11
Candy Quintero), Obstetrics and Gynecology  [address] [place] [address]  Phone: [phone]  Fax: [phone]
11

## 2023-11-30 PROBLEM — K80.20 GALLSTONES: Status: ACTIVE | Noted: 2023-11-30

## 2023-11-30 LAB
ALBUMIN SERPL ELPH-MCNC: 4.3 G/DL — SIGNIFICANT CHANGE UP (ref 3.3–5)
ALBUMIN SERPL ELPH-MCNC: 4.3 G/DL — SIGNIFICANT CHANGE UP (ref 3.3–5)
ALP SERPL-CCNC: 113 U/L — SIGNIFICANT CHANGE UP (ref 40–120)
ALP SERPL-CCNC: 113 U/L — SIGNIFICANT CHANGE UP (ref 40–120)
ALT FLD-CCNC: 205 U/L — HIGH (ref 10–45)
ALT FLD-CCNC: 205 U/L — HIGH (ref 10–45)
ANION GAP SERPL CALC-SCNC: 12 MMOL/L — SIGNIFICANT CHANGE UP (ref 5–17)
ANION GAP SERPL CALC-SCNC: 12 MMOL/L — SIGNIFICANT CHANGE UP (ref 5–17)
AST SERPL-CCNC: 93 U/L — HIGH (ref 10–40)
AST SERPL-CCNC: 93 U/L — HIGH (ref 10–40)
BILIRUB SERPL-MCNC: 0.3 MG/DL — SIGNIFICANT CHANGE UP (ref 0.2–1.2)
BILIRUB SERPL-MCNC: 0.3 MG/DL — SIGNIFICANT CHANGE UP (ref 0.2–1.2)
BUN SERPL-MCNC: 5 MG/DL — LOW (ref 7–23)
BUN SERPL-MCNC: 5 MG/DL — LOW (ref 7–23)
CALCIUM SERPL-MCNC: 9.2 MG/DL — SIGNIFICANT CHANGE UP (ref 8.4–10.5)
CALCIUM SERPL-MCNC: 9.2 MG/DL — SIGNIFICANT CHANGE UP (ref 8.4–10.5)
CHLORIDE SERPL-SCNC: 101 MMOL/L — SIGNIFICANT CHANGE UP (ref 96–108)
CHLORIDE SERPL-SCNC: 101 MMOL/L — SIGNIFICANT CHANGE UP (ref 96–108)
CO2 SERPL-SCNC: 25 MMOL/L — SIGNIFICANT CHANGE UP (ref 22–31)
CO2 SERPL-SCNC: 25 MMOL/L — SIGNIFICANT CHANGE UP (ref 22–31)
CREAT SERPL-MCNC: 0.55 MG/DL — SIGNIFICANT CHANGE UP (ref 0.5–1.3)
CREAT SERPL-MCNC: 0.55 MG/DL — SIGNIFICANT CHANGE UP (ref 0.5–1.3)
EGFR: 115 ML/MIN/1.73M2 — SIGNIFICANT CHANGE UP
EGFR: 115 ML/MIN/1.73M2 — SIGNIFICANT CHANGE UP
GLUCOSE SERPL-MCNC: 127 MG/DL — HIGH (ref 70–99)
GLUCOSE SERPL-MCNC: 127 MG/DL — HIGH (ref 70–99)
HCT VFR BLD CALC: 37.2 % — SIGNIFICANT CHANGE UP (ref 34.5–45)
HCT VFR BLD CALC: 37.2 % — SIGNIFICANT CHANGE UP (ref 34.5–45)
HGB BLD-MCNC: 12.4 G/DL — SIGNIFICANT CHANGE UP (ref 11.5–15.5)
HGB BLD-MCNC: 12.4 G/DL — SIGNIFICANT CHANGE UP (ref 11.5–15.5)
MAGNESIUM SERPL-MCNC: 2.3 MG/DL — SIGNIFICANT CHANGE UP (ref 1.6–2.6)
MAGNESIUM SERPL-MCNC: 2.3 MG/DL — SIGNIFICANT CHANGE UP (ref 1.6–2.6)
MCHC RBC-ENTMCNC: 27.2 PG — SIGNIFICANT CHANGE UP (ref 27–34)
MCHC RBC-ENTMCNC: 27.2 PG — SIGNIFICANT CHANGE UP (ref 27–34)
MCHC RBC-ENTMCNC: 33.3 GM/DL — SIGNIFICANT CHANGE UP (ref 32–36)
MCHC RBC-ENTMCNC: 33.3 GM/DL — SIGNIFICANT CHANGE UP (ref 32–36)
MCV RBC AUTO: 81.6 FL — SIGNIFICANT CHANGE UP (ref 80–100)
MCV RBC AUTO: 81.6 FL — SIGNIFICANT CHANGE UP (ref 80–100)
NRBC # BLD: 0 /100 WBCS — SIGNIFICANT CHANGE UP (ref 0–0)
NRBC # BLD: 0 /100 WBCS — SIGNIFICANT CHANGE UP (ref 0–0)
PHOSPHATE SERPL-MCNC: 2.9 MG/DL — SIGNIFICANT CHANGE UP (ref 2.5–4.5)
PHOSPHATE SERPL-MCNC: 2.9 MG/DL — SIGNIFICANT CHANGE UP (ref 2.5–4.5)
PLATELET # BLD AUTO: 262 K/UL — SIGNIFICANT CHANGE UP (ref 150–400)
PLATELET # BLD AUTO: 262 K/UL — SIGNIFICANT CHANGE UP (ref 150–400)
POTASSIUM SERPL-MCNC: 4.3 MMOL/L — SIGNIFICANT CHANGE UP (ref 3.5–5.3)
POTASSIUM SERPL-MCNC: 4.3 MMOL/L — SIGNIFICANT CHANGE UP (ref 3.5–5.3)
POTASSIUM SERPL-SCNC: 4.3 MMOL/L — SIGNIFICANT CHANGE UP (ref 3.5–5.3)
POTASSIUM SERPL-SCNC: 4.3 MMOL/L — SIGNIFICANT CHANGE UP (ref 3.5–5.3)
PROT SERPL-MCNC: 6.9 G/DL — SIGNIFICANT CHANGE UP (ref 6–8.3)
PROT SERPL-MCNC: 6.9 G/DL — SIGNIFICANT CHANGE UP (ref 6–8.3)
RBC # BLD: 4.56 M/UL — SIGNIFICANT CHANGE UP (ref 3.8–5.2)
RBC # BLD: 4.56 M/UL — SIGNIFICANT CHANGE UP (ref 3.8–5.2)
RBC # FLD: 13.2 % — SIGNIFICANT CHANGE UP (ref 10.3–14.5)
RBC # FLD: 13.2 % — SIGNIFICANT CHANGE UP (ref 10.3–14.5)
SODIUM SERPL-SCNC: 138 MMOL/L — SIGNIFICANT CHANGE UP (ref 135–145)
SODIUM SERPL-SCNC: 138 MMOL/L — SIGNIFICANT CHANGE UP (ref 135–145)
WBC # BLD: 6.54 K/UL — SIGNIFICANT CHANGE UP (ref 3.8–10.5)
WBC # BLD: 6.54 K/UL — SIGNIFICANT CHANGE UP (ref 3.8–10.5)
WBC # FLD AUTO: 6.54 K/UL — SIGNIFICANT CHANGE UP (ref 3.8–10.5)
WBC # FLD AUTO: 6.54 K/UL — SIGNIFICANT CHANGE UP (ref 3.8–10.5)

## 2023-11-30 PROCEDURE — 74018 RADEX ABDOMEN 1 VIEW: CPT | Mod: 26

## 2023-11-30 PROCEDURE — 99223 1ST HOSP IP/OBS HIGH 75: CPT | Mod: GC

## 2023-11-30 PROCEDURE — 93010 ELECTROCARDIOGRAM REPORT: CPT

## 2023-11-30 PROCEDURE — 99232 SBSQ HOSP IP/OBS MODERATE 35: CPT

## 2023-11-30 RX ORDER — DEXTROSE MONOHYDRATE, SODIUM CHLORIDE, AND POTASSIUM CHLORIDE 50; .745; 4.5 G/1000ML; G/1000ML; G/1000ML
1000 INJECTION, SOLUTION INTRAVENOUS
Refills: 0 | Status: DISCONTINUED | OUTPATIENT
Start: 2023-11-30 | End: 2023-11-30

## 2023-11-30 RX ORDER — KETOROLAC TROMETHAMINE 30 MG/ML
30 SYRINGE (ML) INJECTION EVERY 8 HOURS
Refills: 0 | Status: DISCONTINUED | OUTPATIENT
Start: 2023-11-30 | End: 2023-12-01

## 2023-11-30 RX ORDER — ONDANSETRON 8 MG/1
4 TABLET, FILM COATED ORAL EVERY 8 HOURS
Refills: 0 | Status: DISCONTINUED | OUTPATIENT
Start: 2023-11-30 | End: 2023-12-01

## 2023-11-30 RX ORDER — MAGNESIUM SULFATE 500 MG/ML
2 VIAL (ML) INJECTION ONCE
Refills: 0 | Status: COMPLETED | OUTPATIENT
Start: 2023-11-30 | End: 2023-11-30

## 2023-11-30 RX ORDER — ACETAMINOPHEN 500 MG
3 TABLET ORAL
Qty: 0 | Refills: 0 | DISCHARGE
Start: 2023-11-30

## 2023-11-30 RX ORDER — MECLIZINE HCL 12.5 MG
25 TABLET ORAL
Refills: 0 | Status: DISCONTINUED | OUTPATIENT
Start: 2023-11-30 | End: 2023-11-30

## 2023-11-30 RX ORDER — DIPHENHYDRAMINE HCL 50 MG
25 CAPSULE ORAL EVERY 8 HOURS
Refills: 0 | Status: DISCONTINUED | OUTPATIENT
Start: 2023-11-30 | End: 2023-12-01

## 2023-11-30 RX ORDER — MECLIZINE HCL 12.5 MG
25 TABLET ORAL
Refills: 0 | Status: DISCONTINUED | OUTPATIENT
Start: 2023-11-30 | End: 2023-12-01

## 2023-11-30 RX ORDER — FAMOTIDINE 10 MG/ML
20 INJECTION INTRAVENOUS DAILY
Refills: 0 | Status: DISCONTINUED | OUTPATIENT
Start: 2023-11-30 | End: 2023-12-01

## 2023-11-30 RX ORDER — METOPROLOL TARTRATE 50 MG
12.5 TABLET ORAL
Refills: 0 | Status: DISCONTINUED | OUTPATIENT
Start: 2023-11-30 | End: 2023-12-01

## 2023-11-30 RX ORDER — METOCLOPRAMIDE HCL 10 MG
4 TABLET ORAL EVERY 8 HOURS
Refills: 0 | Status: DISCONTINUED | OUTPATIENT
Start: 2023-11-30 | End: 2023-11-30

## 2023-11-30 RX ORDER — METOCLOPRAMIDE HCL 10 MG
10 TABLET ORAL EVERY 8 HOURS
Refills: 0 | Status: DISCONTINUED | OUTPATIENT
Start: 2023-11-30 | End: 2023-12-01

## 2023-11-30 RX ORDER — SODIUM CHLORIDE 9 MG/ML
1000 INJECTION, SOLUTION INTRAVENOUS
Refills: 0 | Status: DISCONTINUED | OUTPATIENT
Start: 2023-11-30 | End: 2023-12-01

## 2023-11-30 RX ADMIN — Medication 30 MILLIGRAM(S): at 17:37

## 2023-11-30 RX ADMIN — Medication 975 MILLIGRAM(S): at 21:29

## 2023-11-30 RX ADMIN — Medication 975 MILLIGRAM(S): at 08:09

## 2023-11-30 RX ADMIN — Medication 975 MILLIGRAM(S): at 02:39

## 2023-11-30 RX ADMIN — Medication 975 MILLIGRAM(S): at 13:30

## 2023-11-30 RX ADMIN — Medication 975 MILLIGRAM(S): at 03:09

## 2023-11-30 RX ADMIN — ENOXAPARIN SODIUM 40 MILLIGRAM(S): 100 INJECTION SUBCUTANEOUS at 22:48

## 2023-11-30 RX ADMIN — Medication 30 MILLIGRAM(S): at 17:07

## 2023-11-30 RX ADMIN — Medication 975 MILLIGRAM(S): at 13:03

## 2023-11-30 RX ADMIN — Medication 12.5 MILLIGRAM(S): at 13:03

## 2023-11-30 RX ADMIN — Medication 975 MILLIGRAM(S): at 08:30

## 2023-11-30 RX ADMIN — Medication 975 MILLIGRAM(S): at 20:59

## 2023-11-30 RX ADMIN — Medication 4 MILLIGRAM(S): at 02:37

## 2023-11-30 RX ADMIN — Medication 10 MILLIGRAM(S): at 17:06

## 2023-11-30 RX ADMIN — DEXTROSE MONOHYDRATE, SODIUM CHLORIDE, AND POTASSIUM CHLORIDE 100 MILLILITER(S): 50; .745; 4.5 INJECTION, SOLUTION INTRAVENOUS at 01:48

## 2023-11-30 RX ADMIN — Medication 25 MILLIGRAM(S): at 17:07

## 2023-11-30 RX ADMIN — Medication 25 GRAM(S): at 17:05

## 2023-11-30 NOTE — CONSULT NOTE ADULT - ATTENDING COMMENTS
Ms. Espinoza is a 43 year old right handed woman with a PMHx significant for Graves disease s/p laparoscopic cholecystectomy (11/29/23), with post op course complicated by episodic emesis, nausea, dizziness, and headaches.   I interviewed the patient, discussed the case with the Resident and agree with the findings and plan as documented in the Resident's note except below.  Clinically her symptoms are much better.   Continue medical management, neuro- check and fall precaution.  GI and DVT prophylaxis.  I discussed the diagnosis and treatment plan with the patient. Risk benefit and alternatives to the treatment were discussed. All questions and concerns were addressed. The patient demonstrated good understanding of the treatment plan.  My cumulative time taking care of this patient is 75 minutes  If you have any further questions, please do not hesitate to contact our consult service.  Thank you for allowing us to participate in this patient care.

## 2023-11-30 NOTE — PROVIDER CONTACT NOTE (OTHER) - REASON
Emesis episode x2. Pt c/o of not feeling well and feeling nauseous.
pt c/o dizzyness and headache
pt hypotensive to 93/58

## 2023-11-30 NOTE — PROGRESS NOTE ADULT - SUBJECTIVE AND OBJECTIVE BOX
**** INCOMPLETE NOTE ****    INTERVAL HPI/OVERNIGHT EVENTS:  sp abida charlene  episode of emesis overnight after drinking water - none further  nausea this AM  no fever or chills    dizziness this AM - orthostatics negative    MEDICATIONS  (STANDING):  acetaminophen     Tablet .. 975 milliGRAM(s) Oral every 6 hours  enoxaparin Injectable 40 milliGRAM(s) SubCutaneous every 24 hours  ibuprofen  Tablet. 400 milliGRAM(s) Oral once  influenza   Vaccine 0.5 milliLiter(s) IntraMuscular once  metoprolol tartrate 12.5 milliGRAM(s) Oral two times a day    MEDICATIONS  (PRN):  ondansetron Injectable 4 milliGRAM(s) IV Push every 8 hours PRN Nausea and/or Vomiting  oxyCODONE    IR 2.5 milliGRAM(s) Oral every 4 hours PRN Moderate Pain (4 - 6)  oxyCODONE    IR 5 milliGRAM(s) Oral every 4 hours PRN Severe Pain (7 - 10)      Allergies  sulfa drugs (Rash)      Review of Systems:  see HPI- remainder 10 point ROS negative    Vital Signs Last 24 Hrs  T(C): 36.3 (30 Nov 2023 12:35), Max: 37.4 (29 Nov 2023 14:32)  T(F): 97.4 (30 Nov 2023 12:35), Max: 98.8 (30 Nov 2023 05:56)  HR: 77 (30 Nov 2023 12:35) (60 - 83)  BP: 114/64 (30 Nov 2023 12:35) (101/65 - 146/70)  BP(mean): 86 (29 Nov 2023 17:30) (86 - 101)  RR: 18 (30 Nov 2023 12:35) (16 - 18)  SpO2: 97% (30 Nov 2023 12:35) (93% - 100%)    Parameters below as of 30 Nov 2023 12:35  Patient On (Oxygen Delivery Method): room air    PHYSICAL EXAM:      LABS:                        12.4   6.54  )-----------( 262      ( 30 Nov 2023 07:07 )             37.2     11-30    138  |  101  |  5<L>  ----------------------------<  127<H>  4.3   |  25  |  0.55    Ca    9.2      30 Nov 2023 07:04  Phos  2.9     11-30  Mg     2.3     11-30    TPro  6.9  /  Alb  4.3  /  TBili  0.3  /  DBili  x   /  AST  93<H>  /  ALT  205<H>  /  AlkPhos  113  11-30    PT/INR - ( 29 Nov 2023 04:30 )   PT: 11.9 sec;   INR: 1.14 ratio    PTT - ( 29 Nov 2023 04:30 )  PTT:34.4 sec        RADIOLOGY & ADDITIONAL TESTS:     INTERVAL HPI/OVERNIGHT EVENTS:  sp lap charlene  episode of emesis overnight after drinking water - none further  nausea this AM, improved. has tolerated some PO (low fat Kosher diet)  no fever or chills    dizziness this AM - orthostatics negative  +some gas discomfort, mild incisional discomfort - controlled with current regimen  ambulating to bathroom and has sat in chair  +flatus, no BM      MEDICATIONS  (STANDING):  acetaminophen     Tablet .. 975 milliGRAM(s) Oral every 6 hours  enoxaparin Injectable 40 milliGRAM(s) SubCutaneous every 24 hours  ibuprofen  Tablet. 400 milliGRAM(s) Oral once  influenza   Vaccine 0.5 milliLiter(s) IntraMuscular once  metoprolol tartrate 12.5 milliGRAM(s) Oral two times a day    MEDICATIONS  (PRN):  ondansetron Injectable 4 milliGRAM(s) IV Push every 8 hours PRN Nausea and/or Vomiting  oxyCODONE    IR 2.5 milliGRAM(s) Oral every 4 hours PRN Moderate Pain (4 - 6)  oxyCODONE    IR 5 milliGRAM(s) Oral every 4 hours PRN Severe Pain (7 - 10)      Allergies  sulfa drugs (Rash)      Review of Systems:  see HPI- remainder 10 point ROS negative    Vital Signs Last 24 Hrs  T(C): 36.3 (30 Nov 2023 12:35), Max: 37.4 (29 Nov 2023 14:32)  T(F): 97.4 (30 Nov 2023 12:35), Max: 98.8 (30 Nov 2023 05:56)  HR: 77 (30 Nov 2023 12:35) (60 - 83)  BP: 114/64 (30 Nov 2023 12:35) (101/65 - 146/70)  BP(mean): 86 (29 Nov 2023 17:30) (86 - 101)  RR: 18 (30 Nov 2023 12:35) (16 - 18)  SpO2: 97% (30 Nov 2023 12:35) (93% - 100%)    Parameters below as of 30 Nov 2023 12:35  Patient On (Oxygen Delivery Method): room air    PHYSICAL EXAM:  Constitutional: NAD, well-developed non toxic appearing female lying in bed  Neck: No LAD, supple no JVD  Respiratory: bl air entry, no increased WOB  Cardiovascular: S1 and S2, regular  Gastrointestinal: BS+, softly distended,  appropriate dustin-incisional/port site tenderness. port sites clean/dry  Extremities: No peripheral edema, neg clubbing, cyanosis  Vascular: 2+ peripheral pulses  Neurological: A/O x 3, no focal deficits  Psychiatric: Normal mood, normal affect  Skin: No rashes, anicteric      LABS:                        12.4   6.54  )-----------( 262      ( 30 Nov 2023 07:07 )             37.2     11-30    138  |  101  |  5<L>  ----------------------------<  127<H>  4.3   |  25  |  0.55    Ca    9.2      30 Nov 2023 07:04  Phos  2.9     11-30  Mg     2.3     11-30    TPro  6.9  /  Alb  4.3  /  TBili  0.3  /  DBili  x   /  AST  93<H>  /  ALT  205<H>  /  AlkPhos  113  11-30    PT/INR - ( 29 Nov 2023 04:30 )   PT: 11.9 sec;   INR: 1.14 ratio    PTT - ( 29 Nov 2023 04:30 )  PTT:34.4 sec    Surgical Pathology Report (11.28.23 @ 15:39)   Surgical Pathology Report:   ACCESSION No: [id number]   Patient: NEVA NEVILLE   Accession: [id number]   Collected Date/Time: 11/28/2023 15:39 EST   Received Date/Time: 11/28/2023 15:39 EST   Surgical Pathology Report - Auth (Verified)   Specimen(s) Submitted   1 Antrum biopsy   2 Cardia erosion bx   3 Esophagus, biopsy   Final Diagnosis   1. Stomach antrum, biopsy:   - Reactive gastropathy.   Negative for H. pylori.   Negative for intestinal metaplasia.   2. Cardia, biopsy:   - Columnar cardiac mucosa with reactive change.   Negative for intestinal metaplasia.   Negative for H. pylori.   3. Esophagus, biopsy:   - Squamous mucosa with no pathologic change.   No columnar mucosa identified.   No intraepithelial eosinophils seen.   Verified by: Narciso Sarabia MD       RADIOLOGY & ADDITIONAL TESTS:

## 2023-11-30 NOTE — CHART NOTE - NSCHARTNOTEFT_GEN_A_CORE
Surgery called for 2 episodes of emesis, bilious overnight, 2/2 to nausea. Seen and discussed with senior on call. Diet backed down to NPO/IVF, Zofran and Reglan (following EKG stat) for nausea, and abdominal X-ray.    [facility]  [id number]

## 2023-11-30 NOTE — CONSULT NOTE ADULT - SUBJECTIVE AND OBJECTIVE BOX
Neurology - Consult Note    -  Spectra: [phone] ([facility]), [phone] ([facility])  -    HPI: Patient NEVA NEVILLE is a 43y (1980) wo/man with a PMHx significant for ***      Review of Systems:  INCOMPLETE   CONSTITUTIONAL: No fevers or chills  EYES AND ENT: No visual changes or no throat pain   NECK: No pain or stiffness  RESPIRATORY: No hemoptysis or shortness of breath  CARDIOVASCULAR: No chest pain or palpitations  GASTROINTESTINAL: No melena or hematochezia  GENITOURINARY: No dysuria or hematuria  NEUROLOGICAL: +As stated in HPI above  SKIN: No itching, burning, rashes, or lesions   All other review of systems is negative unless indicated above.    Allergies:  sulfa drugs (Rash)      PMHx/PSHx/Family Hx: As above, otherwise see below   Graves disease        Social Hx:  No current use of tobacco, alcohol, or illicit drugs  Lives with ***    Medications:  MEDICATIONS  (STANDING):  acetaminophen     Tablet .. 975 milliGRAM(s) Oral every 6 hours  enoxaparin Injectable 40 milliGRAM(s) SubCutaneous every 24 hours  ibuprofen  Tablet. 400 milliGRAM(s) Oral once  influenza   Vaccine 0.5 milliLiter(s) IntraMuscular once  metoprolol tartrate 12.5 milliGRAM(s) Oral two times a day    MEDICATIONS  (PRN):  ondansetron Injectable 4 milliGRAM(s) IV Push every 8 hours PRN Nausea and/or Vomiting  oxyCODONE    IR 2.5 milliGRAM(s) Oral every 4 hours PRN Moderate Pain (4 - 6)  oxyCODONE    IR 5 milliGRAM(s) Oral every 4 hours PRN Severe Pain (7 - 10)      Vitals:  T(C): 36.3 (11-30-23 @ 12:35), Max: 37.4 (11-29-23 @ 14:32)  HR: 77 (11-30-23 @ 12:35) (60 - 83)  BP: 114/64 (11-30-23 @ 12:35) (101/65 - 146/70)  RR: 18 (11-30-23 @ 12:35) (16 - 18)  SpO2: 97% (11-30-23 @ 12:35) (93% - 100%)    Physical Examination: INCOMPLETE  General - NAD, pleasant, cooperative   Cardiovascular - Peripheral pulses palpable, no edema  Neurologic Exam:  Mental status - Awake, Alert, Oriented to person, place, and time. Speech fluent, repetition and naming intact. Follows simple and complex commands. Attention/concentration, recent and remote memory (including registration and recall), and fund of knowledge intact    Cranial nerves:  CN II: Visual fields are full to confrontation. Fundoscopic exam is normal with sharp discs. Pupils are 4 mm and briskly reactive to light. Visual acuity is 20/20 bilaterally.  CN III, IV, VI: EOMI, no nystagmus, no ptosis  CN V: Facial sensation is intact to pinprick in all 3 divisions bilaterally.  CN VII: Face is symmetric with normal eye closure and smile.  CN VII: Hearing is normal to rubbing fingers  CN IX, X: Palate elevates symmetrically. Phonation is normal.  CN XI: Head turning and shoulder shrug are intact  CN XII: Tongue is midline with normal movements and no atrophy.    Motor - Normal bulk and tone throughout. No pronator drift of out-stretched arms.  Strength testing            Deltoid(C5)  Biceps(C6)    Triceps(C7)     Wrist Extension    Wrist Flexion (C8)     Interossei  (T1)       R            5                 5                        5                     5                              5                                      5                         5  L             5                 5                        5                     5                              5                                      5                          5              Hip Flexion(L2/3)    Hip Extension (L4/5)   Knee Flexion (L4/5/S1)    Knee Extension (L3/4)   Dorsiflexion (L4/5)   Plantar Flexion (S1)  R              5                                    5                                     5                                                     5                                              5                          5  L              5                                     5                                     5                                                     5                                              5                          5    Sensation - Light touch/temperature OR pain/vibration intact in fingers and toes     DTR's -             Biceps      Triceps     Brachioradialis      Patellar    Ankle    Toes/plantar response  R             2+             2+                  2+                       2+            2+                 Down  L              2+             2+                 2+                        2+           2+                 Down    Coordination - Rapid alternating movements and fine finger movements are intact. There is no dysmetria on finger-to-nose and heel-knee-shin. There are no abnormal or extraneous movements. Romberg?    Gait and station - Posture is normal. Gait is steady with normal steps, base, arm swing, and turning. Heel and toe walking are normal. Tandem gait is normal       Labs:                        12.4   6.54  )-----------( 262      ( 30 Nov 2023 07:07 )             37.2     11-30    138  |  101  |  5<L>  ----------------------------<  127<H>  4.3   |  25  |  0.55    Ca    9.2      30 Nov 2023 07:04  Phos  2.9     11-30  Mg     2.3     11-30    TPro  6.9  /  Alb  4.3  /  TBili  0.3  /  DBili  x   /  AST  93<H>  /  ALT  205<H>  /  AlkPhos  113  11-30    CAPILLARY BLOOD GLUCOSE        LIVER FUNCTIONS - ( 30 Nov 2023 07:04 )  Alb: 4.3 g/dL / Pro: 6.9 g/dL / ALK PHOS: 113 U/L / ALT: 205 U/L / AST: 93 U/L / GGT: x             PT/INR - ( 29 Nov 2023 04:30 )   PT: 11.9 sec;   INR: 1.14 ratio         PTT - ( 29 Nov 2023 04:30 )  PTT:34.4 sec  CSF:                  Radiology:     Neurology - Consult Note    -  Spectra: [phone] ([facility]), [phone] ([facility])  -    HPI: Patient NEVA NEVILLE is a 43y (1980) woman with a PMHx significant for Graves disease who recently underwent laparoscopic cholecystectomy (11/29/23), with post op course complicated by episodic emesis, nausea, dizziness, and headaches. Emesis and nausea now resolved. AXR (11/30) with mildly air-filled dilated loops of bowel. Orthostatics negative, and patient denies lightheadedness upon standing from sitting position. Neuro was consulted to evaluate dizziness and headaches.     Overnight patient with multiple episodes of nausea and bilious emesis. When seen this afternoon, patient endorses dizziness and "heavy aching" bifrontal headache that have been constant since the patient can remember waking up from surgery. Pt reports nausea was controlled by Zofran, and that metoclopramide may have helped the dizziness because she was able to fall asleep afterwards.    She reports the headache pain has been constant, localized to the bifrontal region, and positional changes do not improve or make the pain worse. Pt denies photo/phonophobia, tearing, and vision changes.     The dizziness has also been constant both sitting and standing. She feels like the room is spinning at times, and at others feels like her head is spinning. Pt denies lightheadedness, tinnitus, and has not lost balance or fallen. The pt is able to get up and use the restroom without assistance from a device or aid but holds on to different objects to stabilize during gait testing. She says she doesn't feel unsteady while walking.    She has never experienced dizziness or constant headache as in this current episode, but occasionally gets headaches that are less severe and of shorter duration.     Patient reports appropriate surgical incisional pain, which is controlled with current pain regimen. Denies fevers/chills, chest pain, palpitations, dyspnea.     Review of Systems:  INCOMPLETE   CONSTITUTIONAL: No fevers or chills  EYES AND ENT: No visual changes or no throat pain   NECK: No pain or stiffness  RESPIRATORY: No hemoptysis or shortness of breath  CARDIOVASCULAR: No chest pain or palpitations  GASTROINTESTINAL: No melena or hematochezia  GENITOURINARY: No dysuria or hematuria  NEUROLOGICAL: +As stated in HPI above  SKIN: No itching, burning, rashes, or lesions   All other review of systems is negative unless indicated above.    Allergies:  sulfa drugs (Rash)      PMHx/PSHx/Family Hx: As above, otherwise see below   Graves disease        Social Hx:  No current use of tobacco, alcohol, or illicit drugs  Lives with ***    Medications:  MEDICATIONS  (STANDING):  acetaminophen     Tablet .. 975 milliGRAM(s) Oral every 6 hours  enoxaparin Injectable 40 milliGRAM(s) SubCutaneous every 24 hours  ibuprofen  Tablet. 400 milliGRAM(s) Oral once  influenza   Vaccine 0.5 milliLiter(s) IntraMuscular once  metoprolol tartrate 12.5 milliGRAM(s) Oral two times a day    MEDICATIONS  (PRN):  ondansetron Injectable 4 milliGRAM(s) IV Push every 8 hours PRN Nausea and/or Vomiting  oxyCODONE    IR 2.5 milliGRAM(s) Oral every 4 hours PRN Moderate Pain (4 - 6)  oxyCODONE    IR 5 milliGRAM(s) Oral every 4 hours PRN Severe Pain (7 - 10)      Vitals:  T(C): 36.3 (11-30-23 @ 12:35), Max: 37.4 (11-29-23 @ 14:32)  HR: 77 (11-30-23 @ 12:35) (60 - 83)  BP: 114/64 (11-30-23 @ 12:35) (101/65 - 146/70)  RR: 18 (11-30-23 @ 12:35) (16 - 18)  SpO2: 97% (11-30-23 @ 12:35) (93% - 100%)    Physical Examination: INCOMPLETE  General - NAD, pleasant, cooperative   Cardiovascular - Peripheral pulses palpable, no edema  Neurologic Exam:  Mental status - Awake, Alert, Oriented to person, place, and time. Speech fluent, repetition and naming intact. Follows simple and complex commands. Attention/concentration, recent and remote memory (including registration and recall), and fund of knowledge intact    Cranial nerves:  CN II: Visual fields are full to confrontation. Fundoscopic exam is normal with sharp discs. Pupils are 4 mm and briskly reactive to light. Visual acuity is 20/20 bilaterally.  CN III, IV, VI: EOMI, no nystagmus, no ptosis. HINTS exam is negative, and H test is negative, although pt reports pain while looking to the right and upwards. When repeated, there is no pain.  CN V: Facial sensation is intact to pinprick in all 3 divisions bilaterally.  CN VII: Face is symmetric with normal eye closure and smile.  CN VII: Hearing is normal to rubbing fingers  CN IX, X: Palate elevates symmetrically. Phonation is normal.  CN XI: Head turning and shoulder shrug are intact  CN XII: Tongue is midline with normal movements and no atrophy.    Motor - Normal bulk and tone throughout. No pronator drift of out-stretched arms.  Strength testing            Deltoid(C5)  Biceps(C6)    Triceps(C7)     Wrist Extension    Wrist Flexion (C8)     Interossei  (T1)       R            5                 5                        5                     5                              5                                      5                         5  L             5                 5                        5                     5                              5                                      5                          5              Hip Flexion(L2/3)    Hip Extension (L4/5)   Knee Flexion (L4/5/S1)    Knee Extension (L3/4)   Dorsiflexion (L4/5)   Plantar Flexion (S1)  R              5                                    5                                     5                                                     5                                              5                          5  L              5                                     5                                     5                                                     5                                              5                          5    Sensation - Light touch/temperature OR pain/vibration intact in fingers and toes     DTR's -             Biceps      Triceps     Brachioradialis      Patellar    Ankle    Toes/plantar response  R             2+             2+                  2+                       2+            2+                 Down  L              2+             2+                 2+                        2+           2+                 Down    Coordination - Rapid alternating movements and fine finger movements are intact. There is no dysmetria on finger-to-nose and heel-knee-shin. There are no abnormal or extraneous movements. Romberg?    Gait and station - Posture is normal. Gait is steady with normal steps, base, arm swing, and turning. Heel and toe walking are normal. Tandem gait is normal       Labs:                        12.4   6.54  )-----------( 262      ( 30 Nov 2023 07:07 )             37.2     11-30    138  |  101  |  5<L>  ----------------------------<  127<H>  4.3   |  25  |  0.55    Ca    9.2      30 Nov 2023 07:04  Phos  2.9     11-30  Mg     2.3     11-30    TPro  6.9  /  Alb  4.3  /  TBili  0.3  /  DBili  x   /  AST  93<H>  /  ALT  205<H>  /  AlkPhos  113  11-30    CAPILLARY BLOOD GLUCOSE        LIVER FUNCTIONS - ( 30 Nov 2023 07:04 )  Alb: 4.3 g/dL / Pro: 6.9 g/dL / ALK PHOS: 113 U/L / ALT: 205 U/L / AST: 93 U/L / GGT: x             PT/INR - ( 29 Nov 2023 04:30 )   PT: 11.9 sec;   INR: 1.14 ratio         PTT - ( 29 Nov 2023 04:30 )  PTT:34.4 sec  CSF:                  Radiology:     Neurology - Consult Note    -  Spectra: [phone] ([facility]), [phone] ([facility])  -    HPI: Patient NEVA NEVILLE is a 43y (1980) woman with a PMHx significant for Graves disease who recently underwent laparoscopic cholecystectomy (11/29/23), with post op course complicated by episodic emesis, nausea, dizziness, and headaches. Emesis and nausea now resolved. AXR (11/30) with mildly air-filled dilated loops of bowel. Orthostatics negative, and patient denies lightheadedness upon standing from sitting position. Neuro was consulted to evaluate dizziness and headaches.     Overnight patient with multiple episodes of nausea and bilious emesis. When seen this afternoon, patient endorses dizziness and "heavy aching" bifrontal headache that have been constant since the patient can remember waking up from surgery. Pt reports nausea was controlled by Zofran, and that metoclopramide may have helped the dizziness because she was able to fall asleep afterwards.    She reports the headache pain has been constant, localized to the bifrontal region, and positional changes do not improve or make the pain worse. Pt denies photo/phonophobia, tearing, and vision changes.     The dizziness has also been constant both sitting and standing. She feels like the room is spinning at times, and at others feels like her head is spinning. Pt denies lightheadedness, tinnitus, and has not lost balance or fallen. The pt is able to get up and use the restroom without assistance from a device or aid but holds on to different objects to stabilize during gait testing. She says she doesn't feel unsteady while walking.    She has never experienced dizziness or constant headache as in this current episode, but occasionally gets headaches that are less severe and of shorter duration.     Patient reports appropriate surgical incisional pain, which is controlled with current pain regimen. Denies fevers/chills, chest pain, palpitations, dyspnea.     Review of Systems:   NEUROLOGICAL: +As stated in HPI above  All other review of systems is negative unless indicated above.    Allergies:  sulfa drugs (Rash)      PMHx/PSHx/Family Hx: As above, otherwise see below   Graves disease        Social Hx:  No current use of tobacco, alcohol, or illicit drugs  Lives with ***    Medications:  MEDICATIONS  (STANDING):  acetaminophen     Tablet .. 975 milliGRAM(s) Oral every 6 hours  enoxaparin Injectable 40 milliGRAM(s) SubCutaneous every 24 hours  ibuprofen  Tablet. 400 milliGRAM(s) Oral once  influenza   Vaccine 0.5 milliLiter(s) IntraMuscular once  metoprolol tartrate 12.5 milliGRAM(s) Oral two times a day    MEDICATIONS  (PRN):  ondansetron Injectable 4 milliGRAM(s) IV Push every 8 hours PRN Nausea and/or Vomiting  oxyCODONE    IR 2.5 milliGRAM(s) Oral every 4 hours PRN Moderate Pain (4 - 6)  oxyCODONE    IR 5 milliGRAM(s) Oral every 4 hours PRN Severe Pain (7 - 10)      Vitals:  T(C): 36.3 (11-30-23 @ 12:35), Max: 37.4 (11-29-23 @ 14:32)  HR: 77 (11-30-23 @ 12:35) (60 - 83)  BP: 114/64 (11-30-23 @ 12:35) (101/65 - 146/70)  RR: 18 (11-30-23 @ 12:35) (16 - 18)  SpO2: 97% (11-30-23 @ 12:35) (93% - 100%)    Physical Examination: INCOMPLETE   General - NAD, pleasant, cooperative   Cardiovascular - Peripheral pulses palpable, no edema  Neurologic Exam:  Mental status - Awake, Alert, Oriented to person, place, and time. Speech fluent, repetition and naming intact. Follows simple and complex commands. Attention/concentration, recent and remote memory (including registration and recall), and fund of knowledge intact    Cranial nerves:  CN II: Visual fields are full to confrontation. Fundoscopic exam is normal with sharp discs. Pupils are 4 mm and briskly reactive to light. Visual acuity is 20/20 bilaterally.  CN III, IV, VI: EOMI, no nystagmus, no ptosis. HINTS exam is negative, and H test is negative, although pt reports pain while looking to the right and upwards. When repeated, there is no pain.  CN V: Facial sensation is intact to pinprick in all 3 divisions bilaterally.  CN VII: Face is symmetric with normal eye closure and smile.  CN VII: Hearing is normal to rubbing fingers  CN IX, X: Palate elevates symmetrically. Phonation is normal.  CN XI: Head turning and shoulder shrug are intact  CN XII: Tongue is midline with normal movements and no atrophy.    Motor - Normal bulk and tone throughout. No pronator drift of out-stretched arms.  Strength testing            Deltoid(C5)  Biceps(C6)    Triceps(C7)     Wrist Extension    Wrist Flexion (C8)     Interossei  (T1)       R            5                 5                        5                     5                              5                                      5                         5  L             5                 5                        5                     5                              5                                      5                          5              Hip Flexion(L2/3)    Hip Extension (L4/5)   Knee Flexion (L4/5/S1)    Knee Extension (L3/4)   Dorsiflexion (L4/5)   Plantar Flexion (S1)  R              5                                    5                                     5                                                     5                                              5                          5  L              5                                     5                                     5                                                     5                                              5                          5    Sensation - Light touch/temperature OR pain/vibration intact in fingers and toes     DTR's -             Biceps      Triceps     Brachioradialis      Patellar    Ankle    Toes/plantar response  R             2+             2+                  2+                       2+            2+                 Down  L              2+             2+                 2+                        2+           2+                 Down    Coordination - Rapid alternating movements and fine finger movements are intact. There is no dysmetria on finger-to-nose and heel-knee-shin. There are no abnormal or extraneous movements. Romberg?    Gait and station - Required support while walking     Few end beat nystagmus   negative head impulse   negative test of skew       Labs:                        12.4   6.54  )-----------( 262      ( 30 Nov 2023 07:07 )             37.2     11-30    138  |  101  |  5<L>  ----------------------------<  127<H>  4.3   |  25  |  0.55    Ca    9.2      30 Nov 2023 07:04  Phos  2.9     11-30  Mg     2.3     11-30    TPro  6.9  /  Alb  4.3  /  TBili  0.3  /  DBili  x   /  AST  93<H>  /  ALT  205<H>  /  AlkPhos  113  11-30    CAPILLARY BLOOD GLUCOSE        LIVER FUNCTIONS - ( 30 Nov 2023 07:04 )  Alb: 4.3 g/dL / Pro: 6.9 g/dL / ALK PHOS: 113 U/L / ALT: 205 U/L / AST: 93 U/L / GGT: x             PT/INR - ( 29 Nov 2023 04:30 )   PT: 11.9 sec;   INR: 1.14 ratio         PTT - ( 29 Nov 2023 04:30 )  PTT:34.4 sec  CSF:                  Radiology:  < from: Xray Abdomen 1 View PORTABLE -Urgent (Xray Abdomen 1 View PORTABLE -Urgent .) (11.30.23 @ 04:34) >  Free air, which may be postoperative. Mildly air-filled   dilated loops of large and small bowel. No hussain rectosigmoid colonic gas   can be identified.    < end of copied text >     Neurology - Consult Note    -  Spectra: [phone] ([facility]), [phone] ([facility])  -    HPI: Patient NEVA NEVILLE is a 43y (1980) woman with a PMHx significant for Graves disease who recently underwent laparoscopic cholecystectomy (11/29/23), with post op course complicated by episodic emesis, nausea, dizziness, and headaches. Emesis and nausea now resolved. AXR (11/30) with mildly air-filled dilated loops of bowel. Orthostatics negative, and patient denies lightheadedness upon standing from sitting position. Neuro was consulted to evaluate dizziness and headaches.     Overnight patient with multiple episodes of nausea and bilious emesis. When seen this afternoon, patient endorses dizziness and "heavy aching" bifrontal headache that have been constant since the patient can remember waking up from surgery. Pt reports nausea was controlled by Zofran, and that metoclopramide may have helped the dizziness because she was able to fall asleep afterwards.    She reports the headache pain has been constant, localized to the bifrontal region, and positional changes do not improve or make the pain worse. Pt denies photo/phonophobia, tearing, and vision changes.     The dizziness has also been constant both sitting and standing. She feels like the room is spinning at times, and at others feels like her head is spinning. Pt denies lightheadedness, tinnitus, and has not lost balance or fallen. The pt is able to get up and use the restroom without assistance from a device or aid but holds on to different objects to stabilize during gait testing. She says she doesn't feel unsteady while walking.    She has never experienced dizziness or constant headache as in this current episode, but occasionally gets headaches that are less severe and of shorter duration.     Patient reports appropriate surgical incisional pain, which is controlled with current pain regimen. Denies fevers/chills, chest pain, palpitations, dyspnea.     Review of Systems:   NEUROLOGICAL: +As stated in HPI above  All other review of systems is negative unless indicated above.    Allergies:  sulfa drugs (Rash)      PMHx/PSHx/Family Hx: As above, otherwise see below   Graves disease        Social Hx:  No current use of tobacco, alcohol, or illicit drugs  Lives with ***    Medications:  MEDICATIONS  (STANDING):  acetaminophen     Tablet .. 975 milliGRAM(s) Oral every 6 hours  enoxaparin Injectable 40 milliGRAM(s) SubCutaneous every 24 hours  ibuprofen  Tablet. 400 milliGRAM(s) Oral once  influenza   Vaccine 0.5 milliLiter(s) IntraMuscular once  metoprolol tartrate 12.5 milliGRAM(s) Oral two times a day    MEDICATIONS  (PRN):  ondansetron Injectable 4 milliGRAM(s) IV Push every 8 hours PRN Nausea and/or Vomiting  oxyCODONE    IR 2.5 milliGRAM(s) Oral every 4 hours PRN Moderate Pain (4 - 6)  oxyCODONE    IR 5 milliGRAM(s) Oral every 4 hours PRN Severe Pain (7 - 10)      Vitals:  T(C): 36.3 (11-30-23 @ 12:35), Max: 37.4 (11-29-23 @ 14:32)  HR: 77 (11-30-23 @ 12:35) (60 - 83)  BP: 114/64 (11-30-23 @ 12:35) (101/65 - 146/70)  RR: 18 (11-30-23 @ 12:35) (16 - 18)  SpO2: 97% (11-30-23 @ 12:35) (93% - 100%)    Physical Examination:   General - NAD, pleasant, cooperative   Cardiovascular - Peripheral pulses palpable, no edema  Neurologic Exam:  Mental status - Awake, Alert, Oriented to person, place, and time. Speech fluent, repetition and naming intact. Follows simple and complex commands. Attention/concentration, recent and remote memory (including registration and recall), and fund of knowledge intact    Cranial nerves:  CN II: Visual fields are full to confrontation. Pupils are 4 mm and briskly reactive to light. Visual acuity is 20/20 bilaterally.  CN III, IV, VI: EOMI, no nystagmus, no ptosis. HINTS exam is negative, and H test is negative, although pt reports pain while looking to the right and upwards. When repeated, there is no pain.  CN V: Facial sensation is intact to pinprick in all 3 divisions bilaterally.  CN VII: Face is symmetric with normal eye closure and smile.  CN VII: Hearing is normal to rubbing fingers  CN IX, X: Palate elevates symmetrically. Phonation is normal.  CN XI: Head turning and shoulder shrug are intact  CN XII: Tongue is midline with normal movements and no atrophy.    Motor - Normal bulk and tone throughout. No pronator drift of out-stretched arms.  Strength testing            Deltoid(C5)  Biceps(C6)    Triceps(C7)     Wrist Extension    Wrist Flexion (C8)     Interossei  (T1)       R            5                 5                        5                     5                              5                                      5                         5  L             5                 5                        5                     5                              5                                      5                          5              Hip Flexion(L2/3)    Hip Extension (L4/5)   Knee Flexion (L4/5/S1)    Knee Extension (L3/4)   Dorsiflexion (L4/5)   Plantar Flexion (S1)  R              5                                    5                                     5                                                     5                                              5                          5  L              5                                     5                                     5                                                     5                                              5                          5    Sensation - Light touchintact in fingers and toes     DTR's -             Biceps      Triceps     Brachioradialis      Patellar    Ankle    Toes/plantar response  R             2+             2+                  2+                       2+            2+                 Down  L              2+             2+                 2+                        2+           2+                 Down    Coordination - Rapid alternating movements and fine finger movements are intact. There is no dysmetria on finger-to-nose   Gait and station - Required support while walking     Few end beat nystagmus   negative head impulse   negative test of skew       Labs:                        12.4   6.54  )-----------( 262      ( 30 Nov 2023 07:07 )             37.2     11-30    138  |  101  |  5<L>  ----------------------------<  127<H>  4.3   |  25  |  0.55    Ca    9.2      30 Nov 2023 07:04  Phos  2.9     11-30  Mg     2.3     11-30    TPro  6.9  /  Alb  4.3  /  TBili  0.3  /  DBili  x   /  AST  93<H>  /  ALT  205<H>  /  AlkPhos  113  11-30    CAPILLARY BLOOD GLUCOSE        LIVER FUNCTIONS - ( 30 Nov 2023 07:04 )  Alb: 4.3 g/dL / Pro: 6.9 g/dL / ALK PHOS: 113 U/L / ALT: 205 U/L / AST: 93 U/L / GGT: x             PT/INR - ( 29 Nov 2023 04:30 )   PT: 11.9 sec;   INR: 1.14 ratio         PTT - ( 29 Nov 2023 04:30 )  PTT:34.4 sec  CSF:                  Radiology:  < from: Xray Abdomen 1 View PORTABLE -Urgent (Xray Abdomen 1 View PORTABLE -Urgent .) (11.30.23 @ 04:34) >  Free air, which may be postoperative. Mildly air-filled   dilated loops of large and small bowel. No hussain rectosigmoid colonic gas   can be identified.    < end of copied text >

## 2023-11-30 NOTE — PROVIDER CONTACT NOTE (OTHER) - SITUATION
Emesis episode x2. Pt c/o of not feeling well and feeling nauseous.
pt hypotensive to 93/58 and asymptomatic
pt with complaints of dizzyness and heachache

## 2023-11-30 NOTE — PROGRESS NOTE ADULT - SUBJECTIVE AND OBJECTIVE BOX
DATE OF SERVICE: 11-30-23 @ 16:07    Patient is a 43y old  Female who presents with a chief complaint of abdominal pain (30 Nov 2023 14:13)      INTERVAL HISTORY: In no acute distress.    REVIEW OF SYSTEMS:  CONSTITUTIONAL: No weakness  EYES/ENT: No visual changes;  No throat pain   NECK: No pain or stiffness  RESPIRATORY: No cough, wheezing; No shortness of breath  CARDIOVASCULAR: No chest pain or palpitations  GASTROINTESTINAL: No abdominal  pain. No nausea, vomiting, or hematemesis  GENITOURINARY: No dysuria, frequency or hematuria  NEUROLOGICAL: No stroke like symptoms  SKIN: No rashes    	  MEDICATIONS:  metoprolol tartrate 12.5 milliGRAM(s) Oral two times a day        PHYSICAL EXAM:  T(C): 36.3 (11-30-23 @ 12:35), Max: 37.1 (11-29-23 @ 23:49)  HR: 77 (11-30-23 @ 12:35) (64 - 79)  BP: 114/64 (11-30-23 @ 12:35) (101/65 - 138/71)  RR: 18 (11-30-23 @ 12:35) (16 - 18)  SpO2: 97% (11-30-23 @ 12:35) (93% - 100%)  Wt(kg): --  I&O's Summary    29 Nov 2023 07:01  -  30 Nov 2023 07:00  --------------------------------------------------------  IN: 720 mL / OUT: 2000 mL / NET: -1280 mL    30 Nov 2023 07:01  -  30 Nov 2023 16:07  --------------------------------------------------------  IN: 220 mL / OUT: 1100 mL / NET: -880 mL          Appearance: In no distress	  HEENT:    PERRL, EOMI	  Cardiovascular:  S1 S2, No JVD  Respiratory: Lungs clear to auscultation	  Gastrointestinal:  Soft, Non-tender, + BS	  Vascularature:  No edema of LE  Psychiatric: Appropriate affect   Neuro: no acute focal deficits                               12.4   6.54  )-----------( 262      ( 30 Nov 2023 07:07 )             37.2     11-30    138  |  101  |  5<L>  ----------------------------<  127<H>  4.3   |  25  |  0.55    Ca    9.2      30 Nov 2023 07:04  Phos  2.9     11-30  Mg     2.3     11-30    TPro  6.9  /  Alb  4.3  /  TBili  0.3  /  DBili  x   /  AST  93<H>  /  ALT  205<H>  /  AlkPhos  113  11-30        Labs personally reviewed      ASSESSMENT/PLAN: 	    44 yo F with PMHx of Graves disease with no prior surgical history presents to the ED due to intermittent epigastric pain for the past 3 days. U/S and CT imaging with cholelithiasis. Overall picture equivocal for acute cholecystitis. HIDA scan negative for acute cholecystitis or biliary obstruction.     1. Acute Cholecystitis  - CT A/P shows probable cholelithiasis  - HIDA scan negative  - PO trial overnight with patient reporting increased pain again  - s/p lap choley 11/29    2. Palpitations  - c/w home metoprolol 12.5mg PO BID with hold parameters while inpatient    3. Cardiac Risk Stratification  - ECG NSR  - Reports excellent functional capacity  - No hx of severe AS/MS. No appreciable murmur on exam.  - Patient is low risk for mod risk surgery. No contraindication to proceed.   - Tolerated surgery well    4. Dizziness  - Orthos neg  - Neuro consult appreciated  - CTH pending  - Meclizine PRN  - IVF          SANDRA Colon-NP   Cristofer Han DO [facility]  Cardiovascular Medicine  [address], [address]  Available through call or text on [employer] TEAM[employer]  Office: [phone]

## 2023-11-30 NOTE — PROGRESS NOTE ADULT - ASSESSMENT
44 yo F with PMHx of Graves disease with no prior surgical history presents to the ED due to intermittent epigastric pain for the past 3 days. U/S and CT imaging with cholelithiasis. Overall picture equivocal for acute cholecystitis. HIDA scan negative for acute cholecystitis or biliary obstruction. c/f biliary colic. Now s/p laparoscopic cholecystectomy. Post op course complicated by episodic emesis.    Plan:   - NPO/IVF  - Pain control PRN  - Zofran and Reglan for nausea  - f/u am labs  - f/u axr  - DVT ppx: [facility]  [id number] 44 yo F with PMHx of Graves disease with no prior surgical history presents to the ED due to intermittent epigastric pain for the past 3 days. U/S and CT imaging with cholelithiasis. Overall picture equivocal for acute cholecystitis. HIDA scan negative for acute cholecystitis or biliary obstruction. c/f biliary colic. Now s/p laparoscopic cholecystectomy. Post op course complicated by episodic emesis. AXR (11/30) with mildly air-filled dilated loops of bowel. Post op course also c/o dizziness and headaches; orthostatics negative    Plan:   - Adv to low fat diet  - Pain control PRN  - Zofran PRN for nausea  - f/u am labs  - DVT ppx: [facility]  [id number]

## 2023-11-30 NOTE — PROGRESS NOTE ADULT - ASSESSMENT
44 yo F with PMHx of Graves disease (recently diagnosed) with no prior surgical history presents to the ED due to intermittent epigastric pain since Sunday, Reported substernal burning and dyspepsia with sensation of food "going down slowly" followed by upper abdominal pain that radiated to back. no fever or chills, no rash. Pain worsened with eating, minimal improvement after IV Pepcid.     Mild transaminitis without hyperbilirubinemia and normal lipase    CT AP: +liver cyst, ?gallstones  US: GB polyp vs stone. no US evidence of ac cholecystitis  HIDA negative  HCG - negative    #post-prandial epigastric pain , suspect 2/2 biliary colic; no findings on EGD 11/28 to explain pt's symptoms  11/28 EGD: small patch of eroded erythematous mucosa (gastric cardia PATH: columnar mucosa with reactive changes, negative for intestinal metaplasia, neg HP), small sliding HH.  Esophageal biopsies negative for EoE, squamous mucosa with no pathologic change; antral biopsies PATH: reactive gastropathy, neg HP, neg intestinal metaplasia. normal duodenum  s/p lap charlene 11/29/23    -post-op care per Surgery  -Pepcid for GI ppx (also on Ibuprofen)    Toby Medeiros PA-C  [facility] Non Teaching GI Service  [facility]  [phone]  Available on TEAMS Mon-Fri 8a-4p  After hours and weekend coverage [phone]

## 2023-11-30 NOTE — PROVIDER CONTACT NOTE (OTHER) - ASSESSMENT
pt hypotensive to 93/58 and asymptomatic, all other VSS
alert and oriented x4.
AOX4 ; Pt denies SOB or chest pain. Vss completed. 135/67 HR 76 ; temp 98.7; RR18 o2 96%

## 2023-11-30 NOTE — CONSULT NOTE ADULT - ASSESSMENT
ASSESSMENT       IMPRESSION       RECOMMENDATION         Patient to be seen by team and attending. Note finalized upon attending attestation.  ASSESSMENT  Patient NEVA NEVILLE is a 43y (1980) woman with a PMHx significant for Graves disease who recently underwent laparoscopic cholecystectomy (11/29/23), with post op course complicated by episodic emesis, nausea, dizziness, and headaches. Neuro was consulted for dizziness and headaches. DDx includes dehydration, intracranial hypotension, anesthesia/narcotic-induced dizziness and headache, tension headache, ....      IMPRESSION       RECOMMENDATION         Patient to be seen by team and attending. Note finalized upon attending attestation.  ASSESSMENT  Patient NEVA NEVILLE is a 43y (1980) woman with a PMHx significant for Graves disease who recently underwent laparoscopic cholecystectomy (11/29/23), with post op course complicated by episodic emesis, nausea, dizziness, and headaches. Neuro was consulted for dizziness and headaches. DDx includes dehydration, intracranial hypotension, anesthesia/narcotic-induced dizziness and headache, tension headache, new daily persistent headache, ...      IMPRESSION       RECOMMENDATION         Patient to be seen by team and attending. Note finalized upon attending attestation.  ASSESSMENT  Patient NEVA NEVILLE is a 43y (1980) woman with a PMHx significant for Graves disease who recently underwent laparoscopic cholecystectomy (11/29/23), with post op course complicated by episodic emesis, nausea, dizziness, and headaches. Neuro was consulted for dizziness and headaches.     IMPRESSION   Dizziness and headaches exacerbated by walking. Considering intracranial hypotension vs peripheral vertigo etiology     RECOMMENDATION   [] acquire CT head non con /CTA head and neck   [] Start IVF LR 1L /daily   [] Meclizine 25mg BID PRN   [] Neurology f/u outpatient     Patient to be seen by team and attending. Note finalized upon attending attestation.  ASSESSMENT  Patient NEVA NEVILLE is a 43y (1980) woman with a PMHx significant for Graves disease who recently underwent laparoscopic cholecystectomy (11/29/23), with post op course complicated by episodic emesis, nausea, dizziness, and headaches. Neuro was consulted for dizziness and headaches.     IMPRESSION   Dizziness and headaches. Etiology likely related to post-operation. Seems to be improving with hydration and food    RECOMMENDATION   [] recommend migraine medications: toradol 30mg IV q8h PRN, reglan 10mg q8h PRN, benadryl 25mg IV q8h PRN, IV hydration, Mg 2g IV x1. PLEASE GIVE ALL AT ONCE   [] Start IVF LR 1L /daily   [] Meclizine 25mg BID PRN   [] Neurology f/u outpatient     Patient to be seen by team and attending. Note finalized upon attending attestation.

## 2023-11-30 NOTE — PROVIDER CONTACT NOTE (OTHER) - RECOMMENDATIONS
MD made aware
Neurology to consult. neurology later recommended several medications to be given
MD aware and notified

## 2023-11-30 NOTE — PROGRESS NOTE ADULT - SUBJECTIVE AND OBJECTIVE BOX
[facility] DAILY PROGRESS NOTE    SUBJECTIVE: Overnight patient with multiple episodes of bilious emesis. Seen and evaluated this am.    OBJECTIVE:  Vital Signs Last 24 Hrs  T(C): 37.1 (29 Nov 2023 23:49), Max: 37.4 (29 Nov 2023 13:17)  T(F): 98.7 (29 Nov 2023 23:49), Max: 99.3 (29 Nov 2023 13:17)  HR: 76 (29 Nov 2023 23:49) (60 - 83)  BP: 135/67 (29 Nov 2023 23:49) (93/58 - 146/70)  BP(mean): 86 (29 Nov 2023 17:30) (86 - 101)  RR: 18 (29 Nov 2023 23:49) (16 - 18)  SpO2: 96% (29 Nov 2023 23:49) (93% - 100%)    Parameters below as of 29 Nov 2023 23:49  Patient On (Oxygen Delivery Method): room air      Daily Height in cm: 149.86 (29 Nov 2023 14:32)      STANDING  acetaminophen     Tablet .. 975 milliGRAM(s) Oral every 6 hours  dextrose 5% + sodium chloride 0.45% with potassium chloride 20 mEq/L 1000 milliLiter(s) (100 mL/Hr) IV Continuous <Continuous>  enoxaparin Injectable 40 milliGRAM(s) SubCutaneous every 24 hours  ibuprofen  Tablet. 400 milliGRAM(s) Oral once  influenza   Vaccine 0.5 milliLiter(s) IntraMuscular once    PRN  metoclopramide Injectable 4 milliGRAM(s) IV Push every 8 hours PRN nausea or vomitting  ondansetron Injectable 4 milliGRAM(s) IV Push every 8 hours PRN Nausea and/or Vomiting  oxyCODONE    IR 2.5 milliGRAM(s) Oral every 4 hours PRN Moderate Pain (4 - 6)  oxyCODONE    IR 5 milliGRAM(s) Oral every 4 hours PRN Severe Pain (7 - 10)      Labs:  136  |  102  |  6<L>  ----------------------------<  85    (11-29)  3.6   |  26  |  0.62          Ca    8.9      11-29  Mg    1.9  Phos  2.6          Urinalysis Basic - ( 29 Nov 2023 04:30 )    Color: x / Appearance: x / SG: x / pH: x  Gluc: 85 mg/dL / Ketone: x  / Bili: x / Urobili: x   Blood: x / Protein: x / Nitrite: x   Leuk Esterase: x / RBC: x / WBC x   Sq Epi: x / Non Sq Epi: x / Bacteria: x      Physical Exam:  General: NAD  Respiratory: respirations non labored  Abdominal: soft, nontender, nondistended. port site incisionsx4 c/d/i.  Extremities: FROM, warm  Neurological: A+Ox3    [facility] DAILY PROGRESS NOTE    SUBJECTIVE: Overnight patient with multiple episodes of nausea and bilious emesis. Seen and evaluated this am. Patient endorses dizziness (states "it feels like the room is spinning") and mild headache while laying in bed; patient has been able to get out of bed yet 2/2 dizziness. Pt reports improved nausea with Zofran. Patient reports appropriate surgical incisional pain, which is controlled with current pain regimen. Denies fevers/chills, chest pain, palpitations, dyspnea.     OBJECTIVE:  Vital Signs Last 24 Hrs  T(C): 37.1 (29 Nov 2023 23:49), Max: 37.4 (29 Nov 2023 13:17)  T(F): 98.7 (29 Nov 2023 23:49), Max: 99.3 (29 Nov 2023 13:17)  HR: 76 (29 Nov 2023 23:49) (60 - 83)  BP: 135/67 (29 Nov 2023 23:49) (93/58 - 146/70)  BP(mean): 86 (29 Nov 2023 17:30) (86 - 101)  RR: 18 (29 Nov 2023 23:49) (16 - 18)  SpO2: 96% (29 Nov 2023 23:49) (93% - 100%)    Parameters below as of 29 Nov 2023 23:49  Patient On (Oxygen Delivery Method): room air      Daily Height in cm: 149.86 (29 Nov 2023 14:32)      STANDING  acetaminophen     Tablet .. 975 milliGRAM(s) Oral every 6 hours  dextrose 5% + sodium chloride 0.45% with potassium chloride 20 mEq/L 1000 milliLiter(s) (100 mL/Hr) IV Continuous <Continuous>  enoxaparin Injectable 40 milliGRAM(s) SubCutaneous every 24 hours  ibuprofen  Tablet. 400 milliGRAM(s) Oral once  influenza   Vaccine 0.5 milliLiter(s) IntraMuscular once    PRN  metoclopramide Injectable 4 milliGRAM(s) IV Push every 8 hours PRN nausea or vomitting  ondansetron Injectable 4 milliGRAM(s) IV Push every 8 hours PRN Nausea and/or Vomiting  oxyCODONE    IR 2.5 milliGRAM(s) Oral every 4 hours PRN Moderate Pain (4 - 6)  oxyCODONE    IR 5 milliGRAM(s) Oral every 4 hours PRN Severe Pain (7 - 10)      Labs:  136  |  102  |  6<L>  ----------------------------<  85    (11-29)  3.6   |  26  |  0.62          Ca    8.9      11-29  Mg    1.9  Phos  2.6          Urinalysis Basic - ( 29 Nov 2023 04:30 )    Color: x / Appearance: x / SG: x / pH: x  Gluc: 85 mg/dL / Ketone: x  / Bili: x / Urobili: x   Blood: x / Protein: x / Nitrite: x   Leuk Esterase: x / RBC: x / WBC x   Sq Epi: x / Non Sq Epi: x / Bacteria: x      Physical Exam:  General: NAD  Respiratory: respirations non labored  Abdominal: soft, nontender, nondistended. port site incisionsx4 c/d/i.  Extremities: FROM, warm  Neurological: A+Ox3    [facility] DAILY PROGRESS NOTE    SUBJECTIVE: Overnight patient with multiple episodes of nausea and bilious emesis.   Seen and evaluated this am. Patient endorses dizziness (states "it feels like the room is spinning") and mild headache while laying in bed; patient has been able to get out of bed yet 2/2 dizziness. Pt reports improved nausea with Zofran. Patient reports appropriate surgical incisional pain, which is controlled with current pain regimen. Denies fevers/chills, chest pain, palpitations, dyspnea.     OBJECTIVE:  Vital Signs Last 24 Hrs  T(C): 37.1 (29 Nov 2023 23:49), Max: 37.4 (29 Nov 2023 13:17)  T(F): 98.7 (29 Nov 2023 23:49), Max: 99.3 (29 Nov 2023 13:17)  HR: 76 (29 Nov 2023 23:49) (60 - 83)  BP: 135/67 (29 Nov 2023 23:49) (93/58 - 146/70)  BP(mean): 86 (29 Nov 2023 17:30) (86 - 101)  RR: 18 (29 Nov 2023 23:49) (16 - 18)  SpO2: 96% (29 Nov 2023 23:49) (93% - 100%)    Parameters below as of 29 Nov 2023 23:49  Patient On (Oxygen Delivery Method): room air      Daily Height in cm: 149.86 (29 Nov 2023 14:32)      STANDING  acetaminophen     Tablet .. 975 milliGRAM(s) Oral every 6 hours  dextrose 5% + sodium chloride 0.45% with potassium chloride 20 mEq/L 1000 milliLiter(s) (100 mL/Hr) IV Continuous <Continuous>  enoxaparin Injectable 40 milliGRAM(s) SubCutaneous every 24 hours  ibuprofen  Tablet. 400 milliGRAM(s) Oral once  influenza   Vaccine 0.5 milliLiter(s) IntraMuscular once    PRN  metoclopramide Injectable 4 milliGRAM(s) IV Push every 8 hours PRN nausea or vomitting  ondansetron Injectable 4 milliGRAM(s) IV Push every 8 hours PRN Nausea and/or Vomiting  oxyCODONE    IR 2.5 milliGRAM(s) Oral every 4 hours PRN Moderate Pain (4 - 6)  oxyCODONE    IR 5 milliGRAM(s) Oral every 4 hours PRN Severe Pain (7 - 10)      Labs:  136  |  102  |  6<L>  ----------------------------<  85    (11-29)  3.6   |  26  |  0.62          Ca    8.9      11-29  Mg    1.9  Phos  2.6          Urinalysis Basic - ( 29 Nov 2023 04:30 )    Color: x / Appearance: x / SG: x / pH: x  Gluc: 85 mg/dL / Ketone: x  / Bili: x / Urobili: x   Blood: x / Protein: x / Nitrite: x   Leuk Esterase: x / RBC: x / WBC x   Sq Epi: x / Non Sq Epi: x / Bacteria: x      Physical Exam:  General: NAD  Respiratory: respirations non labored  Abdominal: soft, nontender, nondistended. port site incisionsx4 c/d/i.  Extremities: FROM, warm  Neurological: A+Ox3

## 2023-11-30 NOTE — PROVIDER CONTACT NOTE (OTHER) - ACTION/TREATMENT ORDERED:
Zofran given, Provider assessed Pt at bedside during emesis episode x1. Will continue with plan of care.
Meds given with relief to patient
per MD blood pressure to be re assessed within 30 min

## 2023-12-01 ENCOUNTER — TRANSCRIPTION ENCOUNTER (OUTPATIENT)
Age: 43
End: 2023-12-01

## 2023-12-01 VITALS
HEART RATE: 71 BPM | SYSTOLIC BLOOD PRESSURE: 120 MMHG | TEMPERATURE: 98 F | DIASTOLIC BLOOD PRESSURE: 78 MMHG | OXYGEN SATURATION: 97 % | RESPIRATION RATE: 18 BRPM

## 2023-12-01 LAB
ALBUMIN SERPL ELPH-MCNC: 4 G/DL — SIGNIFICANT CHANGE UP (ref 3.3–5)
ALBUMIN SERPL ELPH-MCNC: 4 G/DL — SIGNIFICANT CHANGE UP (ref 3.3–5)
ALP SERPL-CCNC: 106 U/L — SIGNIFICANT CHANGE UP (ref 40–120)
ALP SERPL-CCNC: 106 U/L — SIGNIFICANT CHANGE UP (ref 40–120)
ALT FLD-CCNC: 259 U/L — HIGH (ref 10–45)
ALT FLD-CCNC: 259 U/L — HIGH (ref 10–45)
ANION GAP SERPL CALC-SCNC: 8 MMOL/L — SIGNIFICANT CHANGE UP (ref 5–17)
ANION GAP SERPL CALC-SCNC: 8 MMOL/L — SIGNIFICANT CHANGE UP (ref 5–17)
AST SERPL-CCNC: 135 U/L — HIGH (ref 10–40)
AST SERPL-CCNC: 135 U/L — HIGH (ref 10–40)
BILIRUB DIRECT SERPL-MCNC: <0.1 MG/DL — SIGNIFICANT CHANGE UP (ref 0–0.3)
BILIRUB DIRECT SERPL-MCNC: <0.1 MG/DL — SIGNIFICANT CHANGE UP (ref 0–0.3)
BILIRUB INDIRECT FLD-MCNC: >0.1 MG/DL — LOW (ref 0.2–1)
BILIRUB INDIRECT FLD-MCNC: >0.1 MG/DL — LOW (ref 0.2–1)
BILIRUB SERPL-MCNC: 0.2 MG/DL — SIGNIFICANT CHANGE UP (ref 0.2–1.2)
BILIRUB SERPL-MCNC: 0.2 MG/DL — SIGNIFICANT CHANGE UP (ref 0.2–1.2)
BUN SERPL-MCNC: 7 MG/DL — SIGNIFICANT CHANGE UP (ref 7–23)
BUN SERPL-MCNC: 7 MG/DL — SIGNIFICANT CHANGE UP (ref 7–23)
CALCIUM SERPL-MCNC: 9 MG/DL — SIGNIFICANT CHANGE UP (ref 8.4–10.5)
CALCIUM SERPL-MCNC: 9 MG/DL — SIGNIFICANT CHANGE UP (ref 8.4–10.5)
CHLORIDE SERPL-SCNC: 105 MMOL/L — SIGNIFICANT CHANGE UP (ref 96–108)
CHLORIDE SERPL-SCNC: 105 MMOL/L — SIGNIFICANT CHANGE UP (ref 96–108)
CO2 SERPL-SCNC: 26 MMOL/L — SIGNIFICANT CHANGE UP (ref 22–31)
CO2 SERPL-SCNC: 26 MMOL/L — SIGNIFICANT CHANGE UP (ref 22–31)
CREAT SERPL-MCNC: 0.56 MG/DL — SIGNIFICANT CHANGE UP (ref 0.5–1.3)
CREAT SERPL-MCNC: 0.56 MG/DL — SIGNIFICANT CHANGE UP (ref 0.5–1.3)
EGFR: 115 ML/MIN/1.73M2 — SIGNIFICANT CHANGE UP
EGFR: 115 ML/MIN/1.73M2 — SIGNIFICANT CHANGE UP
GLUCOSE SERPL-MCNC: 78 MG/DL — SIGNIFICANT CHANGE UP (ref 70–99)
GLUCOSE SERPL-MCNC: 78 MG/DL — SIGNIFICANT CHANGE UP (ref 70–99)
HCT VFR BLD CALC: 37.8 % — SIGNIFICANT CHANGE UP (ref 34.5–45)
HCT VFR BLD CALC: 37.8 % — SIGNIFICANT CHANGE UP (ref 34.5–45)
HGB BLD-MCNC: 12.3 G/DL — SIGNIFICANT CHANGE UP (ref 11.5–15.5)
HGB BLD-MCNC: 12.3 G/DL — SIGNIFICANT CHANGE UP (ref 11.5–15.5)
MAGNESIUM SERPL-MCNC: 2 MG/DL — SIGNIFICANT CHANGE UP (ref 1.6–2.6)
MAGNESIUM SERPL-MCNC: 2 MG/DL — SIGNIFICANT CHANGE UP (ref 1.6–2.6)
MCHC RBC-ENTMCNC: 26.8 PG — LOW (ref 27–34)
MCHC RBC-ENTMCNC: 26.8 PG — LOW (ref 27–34)
MCHC RBC-ENTMCNC: 32.5 GM/DL — SIGNIFICANT CHANGE UP (ref 32–36)
MCHC RBC-ENTMCNC: 32.5 GM/DL — SIGNIFICANT CHANGE UP (ref 32–36)
MCV RBC AUTO: 82.4 FL — SIGNIFICANT CHANGE UP (ref 80–100)
MCV RBC AUTO: 82.4 FL — SIGNIFICANT CHANGE UP (ref 80–100)
NRBC # BLD: 0 /100 WBCS — SIGNIFICANT CHANGE UP (ref 0–0)
NRBC # BLD: 0 /100 WBCS — SIGNIFICANT CHANGE UP (ref 0–0)
PHOSPHATE SERPL-MCNC: 2 MG/DL — LOW (ref 2.5–4.5)
PHOSPHATE SERPL-MCNC: 2 MG/DL — LOW (ref 2.5–4.5)
PLATELET # BLD AUTO: 264 K/UL — SIGNIFICANT CHANGE UP (ref 150–400)
PLATELET # BLD AUTO: 264 K/UL — SIGNIFICANT CHANGE UP (ref 150–400)
POTASSIUM SERPL-MCNC: 3.9 MMOL/L — SIGNIFICANT CHANGE UP (ref 3.5–5.3)
POTASSIUM SERPL-MCNC: 3.9 MMOL/L — SIGNIFICANT CHANGE UP (ref 3.5–5.3)
POTASSIUM SERPL-SCNC: 3.9 MMOL/L — SIGNIFICANT CHANGE UP (ref 3.5–5.3)
POTASSIUM SERPL-SCNC: 3.9 MMOL/L — SIGNIFICANT CHANGE UP (ref 3.5–5.3)
PROT SERPL-MCNC: 6.3 G/DL — SIGNIFICANT CHANGE UP (ref 6–8.3)
PROT SERPL-MCNC: 6.3 G/DL — SIGNIFICANT CHANGE UP (ref 6–8.3)
RBC # BLD: 4.59 M/UL — SIGNIFICANT CHANGE UP (ref 3.8–5.2)
RBC # BLD: 4.59 M/UL — SIGNIFICANT CHANGE UP (ref 3.8–5.2)
RBC # FLD: 13.3 % — SIGNIFICANT CHANGE UP (ref 10.3–14.5)
RBC # FLD: 13.3 % — SIGNIFICANT CHANGE UP (ref 10.3–14.5)
SODIUM SERPL-SCNC: 139 MMOL/L — SIGNIFICANT CHANGE UP (ref 135–145)
SODIUM SERPL-SCNC: 139 MMOL/L — SIGNIFICANT CHANGE UP (ref 135–145)
WBC # BLD: 7.5 K/UL — SIGNIFICANT CHANGE UP (ref 3.8–10.5)
WBC # BLD: 7.5 K/UL — SIGNIFICANT CHANGE UP (ref 3.8–10.5)
WBC # FLD AUTO: 7.5 K/UL — SIGNIFICANT CHANGE UP (ref 3.8–10.5)
WBC # FLD AUTO: 7.5 K/UL — SIGNIFICANT CHANGE UP (ref 3.8–10.5)

## 2023-12-01 PROCEDURE — 99285 EMERGENCY DEPT VISIT HI MDM: CPT

## 2023-12-01 PROCEDURE — C1889: CPT

## 2023-12-01 PROCEDURE — 36415 COLL VENOUS BLD VENIPUNCTURE: CPT

## 2023-12-01 PROCEDURE — 80053 COMPREHEN METABOLIC PANEL: CPT

## 2023-12-01 PROCEDURE — G0378: CPT

## 2023-12-01 PROCEDURE — 93005 ELECTROCARDIOGRAM TRACING: CPT

## 2023-12-01 PROCEDURE — A9537: CPT

## 2023-12-01 PROCEDURE — 96375 TX/PRO/DX INJ NEW DRUG ADDON: CPT

## 2023-12-01 PROCEDURE — 96376 TX/PRO/DX INJ SAME DRUG ADON: CPT

## 2023-12-01 PROCEDURE — 76705 ECHO EXAM OF ABDOMEN: CPT

## 2023-12-01 PROCEDURE — 88305 TISSUE EXAM BY PATHOLOGIST: CPT

## 2023-12-01 PROCEDURE — 78226 HEPATOBILIARY SYSTEM IMAGING: CPT | Mod: MG

## 2023-12-01 PROCEDURE — 88304 TISSUE EXAM BY PATHOLOGIST: CPT

## 2023-12-01 PROCEDURE — 83735 ASSAY OF MAGNESIUM: CPT

## 2023-12-01 PROCEDURE — 85730 THROMBOPLASTIN TIME PARTIAL: CPT

## 2023-12-01 PROCEDURE — 83690 ASSAY OF LIPASE: CPT

## 2023-12-01 PROCEDURE — C9399: CPT

## 2023-12-01 PROCEDURE — 74018 RADEX ABDOMEN 1 VIEW: CPT

## 2023-12-01 PROCEDURE — 80076 HEPATIC FUNCTION PANEL: CPT

## 2023-12-01 PROCEDURE — 86901 BLOOD TYPING SEROLOGIC RH(D): CPT

## 2023-12-01 PROCEDURE — 74177 CT ABD & PELVIS W/CONTRAST: CPT | Mod: ME

## 2023-12-01 PROCEDURE — 85025 COMPLETE CBC W/AUTO DIFF WBC: CPT

## 2023-12-01 PROCEDURE — 80048 BASIC METABOLIC PNL TOTAL CA: CPT

## 2023-12-01 PROCEDURE — 85610 PROTHROMBIN TIME: CPT

## 2023-12-01 PROCEDURE — 96374 THER/PROPH/DIAG INJ IV PUSH: CPT

## 2023-12-01 PROCEDURE — 84100 ASSAY OF PHOSPHORUS: CPT

## 2023-12-01 PROCEDURE — 84702 CHORIONIC GONADOTROPIN TEST: CPT

## 2023-12-01 PROCEDURE — 86900 BLOOD TYPING SEROLOGIC ABO: CPT

## 2023-12-01 PROCEDURE — 86850 RBC ANTIBODY SCREEN: CPT

## 2023-12-01 PROCEDURE — 85027 COMPLETE CBC AUTOMATED: CPT

## 2023-12-01 PROCEDURE — G1004: CPT

## 2023-12-01 RX ORDER — MECLIZINE HCL 12.5 MG
1 TABLET ORAL
Qty: 60 | Refills: 0
Start: 2023-12-01 | End: 2023-12-30

## 2023-12-01 RX ORDER — MECLIZINE HCL 12.5 MG
1 TABLET ORAL
Qty: 0 | Refills: 0 | DISCHARGE
Start: 2023-12-01

## 2023-12-01 RX ORDER — SODIUM,POTASSIUM PHOSPHATES 278-250MG
2 POWDER IN PACKET (EA) ORAL ONCE
Refills: 0 | Status: DISCONTINUED | OUTPATIENT
Start: 2023-12-01 | End: 2023-12-01

## 2023-12-01 RX ADMIN — Medication 12.5 MILLIGRAM(S): at 05:16

## 2023-12-01 RX ADMIN — Medication 975 MILLIGRAM(S): at 09:11

## 2023-12-01 RX ADMIN — Medication 975 MILLIGRAM(S): at 01:57

## 2023-12-01 RX ADMIN — Medication 975 MILLIGRAM(S): at 02:27

## 2023-12-01 NOTE — PROGRESS NOTE ADULT - SUBJECTIVE AND OBJECTIVE BOX
DATE OF SERVICE: 12-01-23 @ 14:11    Patient is a 43y old  Female who presents with a chief complaint of abdominal pain (01 Dec 2023 03:49)      INTERVAL HISTORY: Feels ok.     REVIEW OF SYSTEMS:  CONSTITUTIONAL: No weakness  EYES/ENT: No visual changes;  No throat pain   NECK: No pain or stiffness  RESPIRATORY: No cough, wheezing; No shortness of breath  CARDIOVASCULAR: No chest pain or palpitations  GASTROINTESTINAL: No abdominal  pain. No nausea, vomiting, or hematemesis  GENITOURINARY: No dysuria, frequency or hematuria  NEUROLOGICAL: No stroke like symptoms  SKIN: No rashes    	  MEDICATIONS:  metoprolol tartrate 12.5 milliGRAM(s) Oral two times a day        PHYSICAL EXAM:  T(C): 36.7 (12-01-23 @ 09:34), Max: 36.8 (11-30-23 @ 19:55)  HR: 71 (12-01-23 @ 09:34) (71 - 77)  BP: 120/78 (12-01-23 @ 09:34) (102/67 - 120/78)  RR: 18 (12-01-23 @ 09:34) (18 - 18)  SpO2: 97% (12-01-23 @ 09:34) (97% - 98%)  Wt(kg): --  I&O's Summary    30 Nov 2023 07:01  -  01 Dec 2023 07:00  --------------------------------------------------------  IN: 1635 mL / OUT: 1900 mL / NET: -265 mL    01 Dec 2023 07:01  -  01 Dec 2023 14:11  --------------------------------------------------------  IN: 340 mL / OUT: 0 mL / NET: 340 mL          Appearance: In no distress	  HEENT:    PERRL, EOMI	  Cardiovascular:  S1 S2, No JVD  Respiratory: Lungs clear to auscultation	  Gastrointestinal:  Soft, Non-tender, + BS	  Vascularature:  No edema of LE  Psychiatric: Appropriate affect   Neuro: no acute focal deficits                               12.3   7.50  )-----------( 264      ( 01 Dec 2023 06:48 )             37.8     12-01    139  |  105  |  7   ----------------------------<  78  3.9   |  26  |  0.56    Ca    9.0      01 Dec 2023 06:48  Phos  2.0     12-01  Mg     2.0     12-01    TPro  6.3  /  Alb  4.0  /  TBili  0.2  /  DBili  <0.1  /  AST  135<H>  /  ALT  259<H>  /  AlkPhos  106  12-01        Labs personally reviewed      ASSESSMENT/PLAN: 	    42 yo F with PMHx of Graves disease with no prior surgical history presents to the ED due to intermittent epigastric pain for the past 3 days. U/S and CT imaging with cholelithiasis. Overall picture equivocal for acute cholecystitis. HIDA scan negative for acute cholecystitis or biliary obstruction.     1. Acute Cholecystitis  - CT A/P shows probable cholelithiasis  - HIDA scan negative  - PO trial overnight with patient reporting increased pain again  - s/p lap choley 11/29    2. Palpitations  - c/w home metoprolol 12.5mg PO BID     3. Cardiac Risk Stratification  - ECG NSR  - Reports excellent functional capacity  - No hx of severe AS/MS. No appreciable murmur on exam.  - Patient is low risk for mod risk surgery. No contraindication to proceed.   - Tolerated surgery well    4. Dizziness  - Orthos neg  - Neuro consult appreciated  - CTH pending  - Meclizine PRN  - IVF        Sabina Salazar, AG-NP   Cristofer Han DO [facility]  Cardiovascular Medicine  [address], [address]  Available through call or text on [employer]  Office: [phone]

## 2023-12-01 NOTE — DISCHARGE NOTE NURSING/CASE MANAGEMENT/SOCIAL WORK - NSTRANSFERBELONGINGSRESP_GEN_A_NUR
yes 59 y/o F obese, no pmh COVID positive 59 y/o Obese F with h/o Breast CA COVID positive 12/22, not vaccinated pw "low oxygen level" and shortness of breath. States she has been monitoring it at home and it went as low as high 80's, low 90's. Denies headache, chest pain, abdominal pain, n/v/d. States  is COVID positive admitted here currently with pneumonia.   PMD- [place]

## 2023-12-01 NOTE — PROGRESS NOTE ADULT - SUBJECTIVE AND OBJECTIVE BOX
GREEN TEAM SURGERY DAILY PROGRESS NOTE    SUBJECTIVE: No acute events overnight. Patient seen and examined this am     OBJECTIVE:  Vital Signs Last 24 Hrs  T(C): 36.7 (01 Dec 2023 01:00), Max: 37.1 (30 Nov 2023 05:56)  T(F): 98.1 (01 Dec 2023 01:00), Max: 98.8 (30 Nov 2023 05:56)  HR: 76 (01 Dec 2023 01:00) (64 - 79)  BP: 104/67 (01 Dec 2023 01:00) (102/67 - 125/75)  BP(mean): --  RR: 18 (01 Dec 2023 01:00) (18 - 18)  SpO2: 97% (01 Dec 2023 01:00) (97% - 100%)    Parameters below as of 01 Dec 2023 01:00  Patient On (Oxygen Delivery Method): room air      Daily         STANDING  acetaminophen     Tablet .. 975 milliGRAM(s) Oral every 6 hours  enoxaparin Injectable 40 milliGRAM(s) SubCutaneous every 24 hours  famotidine    Tablet 20 milliGRAM(s) Oral daily  ibuprofen  Tablet. 400 milliGRAM(s) Oral once  influenza   Vaccine 0.5 milliLiter(s) IntraMuscular once  lactated ringers. 1000 milliLiter(s) (100 mL/Hr) IV Continuous <Continuous>  metoprolol tartrate 12.5 milliGRAM(s) Oral two times a day    PRN  diphenhydrAMINE Injectable 25 milliGRAM(s) IV Push every 8 hours PRN Headache/Dizziness  ketorolac   Injectable 30 milliGRAM(s) IV Push every 8 hours PRN Headache/Dizziness  meclizine 25 milliGRAM(s) Oral two times a day PRN Dizziness  metoclopramide Injectable 10 milliGRAM(s) IV Push every 8 hours PRN Headache/Dizziness  ondansetron Injectable 4 milliGRAM(s) IV Push every 8 hours PRN Nausea and/or Vomiting  oxyCODONE    IR 2.5 milliGRAM(s) Oral every 4 hours PRN Moderate Pain (4 - 6)  oxyCODONE    IR 5 milliGRAM(s) Oral every 4 hours PRN Severe Pain (7 - 10)      Labs:  138  |  101  |  5<L>  ----------------------------<  127<H>    (11-30)  4.3   |  25  |  0.55          Ca    9.2      11-30  Mg    2.3  Phos  2.9            Urinalysis Basic - ( 30 Nov 2023 07:04 )    Color: x / Appearance: x / SG: x / pH: x  Gluc: 127 mg/dL / Ketone: x  / Bili: x / Urobili: x   Blood: x / Protein: x / Nitrite: x   Leuk Esterase: x / RBC: x / WBC x   Sq Epi: x / Non Sq Epi: x / Bacteria: x      Physical Exam:  General: NAD  Respiratory: respirations non labored  Abdominal: soft, nontender, nondistended. port site incisionsx4 c/d/i.  Extremities: FROM, warm  Neurological: A+Ox3    GREEN TEAM SURGERY DAILY PROGRESS NOTE    SUBJECTIVE: No acute events overnight. Patient seen and examined this AM.      OBJECTIVE:  Vital Signs Last 24 Hrs  T(C): 36.7 (01 Dec 2023 01:00), Max: 37.1 (30 Nov 2023 05:56)  T(F): 98.1 (01 Dec 2023 01:00), Max: 98.8 (30 Nov 2023 05:56)  HR: 76 (01 Dec 2023 01:00) (64 - 79)  BP: 104/67 (01 Dec 2023 01:00) (102/67 - 125/75)  BP(mean): --  RR: 18 (01 Dec 2023 01:00) (18 - 18)  SpO2: 97% (01 Dec 2023 01:00) (97% - 100%)    Parameters below as of 01 Dec 2023 01:00  Patient On (Oxygen Delivery Method): room air      Daily         STANDING  acetaminophen     Tablet .. 975 milliGRAM(s) Oral every 6 hours  enoxaparin Injectable 40 milliGRAM(s) SubCutaneous every 24 hours  famotidine    Tablet 20 milliGRAM(s) Oral daily  ibuprofen  Tablet. 400 milliGRAM(s) Oral once  influenza   Vaccine 0.5 milliLiter(s) IntraMuscular once  lactated ringers. 1000 milliLiter(s) (100 mL/Hr) IV Continuous <Continuous>  metoprolol tartrate 12.5 milliGRAM(s) Oral two times a day    PRN  diphenhydrAMINE Injectable 25 milliGRAM(s) IV Push every 8 hours PRN Headache/Dizziness  ketorolac   Injectable 30 milliGRAM(s) IV Push every 8 hours PRN Headache/Dizziness  meclizine 25 milliGRAM(s) Oral two times a day PRN Dizziness  metoclopramide Injectable 10 milliGRAM(s) IV Push every 8 hours PRN Headache/Dizziness  ondansetron Injectable 4 milliGRAM(s) IV Push every 8 hours PRN Nausea and/or Vomiting  oxyCODONE    IR 2.5 milliGRAM(s) Oral every 4 hours PRN Moderate Pain (4 - 6)  oxyCODONE    IR 5 milliGRAM(s) Oral every 4 hours PRN Severe Pain (7 - 10)      Labs:  138  |  101  |  5<L>  ----------------------------<  127<H>    (11-30)  4.3   |  25  |  0.55          Ca    9.2      11-30  Mg    2.3  Phos  2.9            Urinalysis Basic - ( 30 Nov 2023 07:04 )    Color: x / Appearance: x / SG: x / pH: x  Gluc: 127 mg/dL / Ketone: x  / Bili: x / Urobili: x   Blood: x / Protein: x / Nitrite: x   Leuk Esterase: x / RBC: x / WBC x   Sq Epi: x / Non Sq Epi: x / Bacteria: x      Physical Exam:  General: NAD  Respiratory: respirations non labored  Abdominal: soft, nontender, nondistended. port site incisionsx4 c/d/i.  Extremities: FROM, warm  Neurological: A+Ox3

## 2023-12-01 NOTE — PROGRESS NOTE ADULT - ATTENDING COMMENTS
POD1 lap charlene  Nausea overnight after drinking water, now resolved, but feels "dizzy" when standing up.  MInimal incisional pain.    Vitals normal, SBP 130s  Afebrile  Abd soft, nondistended, appropriately ttp at incisions  Inc clean and dry    Plan  zofran as needed  check orthostatics  IVF  diet as tolerated
EGD was unremarkable yesterday  Pain with eating yesterday  No fevers    VSS  Abd soft, TTP epigastrium/RUQ    Plan  OR today for lap charlene  Discussed with patient risks and benefits  All questions answered    Maynor Buckley MD
pOD2 s/p lap charlene  Dizziness has resolved, feels much better  Minimal inc pain  Tolerating reg diet    VS normal  Abd soft, NT, NT  Inc clean and dry    Plan for discharge home today    Maynor Buckley MD

## 2023-12-01 NOTE — PROGRESS NOTE ADULT - NS ATTEND AMEND GEN_ALL_CORE FT
Patient care and plan discussed and reviewed with Advanced Care Provider. Plan as outlined above edited by me to reflect our discussion.
Patient care and plan discussed and reviewed with Advanced Care Provider. Plan as outlined above edited by me to reflect our discussion.
43yF with post prandial abd pain, dyspepsia and GB stone/polyp  Getting Lap charlene today as per surgery note  OP FU with GI Dr Osborn
Patient care and plan discussed and reviewed with Advanced Care Provider. Plan as outlined above edited by me to reflect our discussion.
43y F s/p Lap charlene yesterday, and resolved post op nausea  Tolerating clears today  will follow CBC LFTs

## 2023-12-01 NOTE — DISCHARGE NOTE NURSING/CASE MANAGEMENT/SOCIAL WORK - NSDCPEFALRISK_GEN_ALL_CORE
For information on Fall & Injury Prevention, visit: [email or web address] OR  [email or web address] OR  https://www.cdc.gov/steadi/patient.html

## 2023-12-01 NOTE — PROGRESS NOTE ADULT - ASSESSMENT
42 yo F with PMHx of Graves disease with no prior surgical history presents to the ED due to intermittent epigastric pain for the past 3 days. U/S and CT imaging with cholelithiasis. Overall picture equivocal for acute cholecystitis. HIDA scan negative for acute cholecystitis or biliary obstruction. c/f biliary colic. Now s/p laparoscopic cholecystectomy. Post op course complicated by episodic emesis. AXR (11/30) with mildly air-filled dilated loops of bowel. Post op course also c/o dizziness and headaches; orthostatics negative.    Plan:   - Low fat diet  - Pain control PRN  - Zofran PRN for nausea  - LR 100mL/Hr  - Neurology -> LR 1L/day, migraine medications (toradol, reglan, benadryl, Mg); Meclizine 25mg BID PRN  - DVT ppx: [facility]  [id number] 42 yo F with PMHx of Graves disease with no prior surgical history presents to the ED due to intermittent epigastric pain for the past 3 days. U/S and CT imaging with cholelithiasis. Overall picture equivocal for acute cholecystitis. HIDA scan negative for acute cholecystitis or biliary obstruction. c/f biliary colic. Now s/p laparoscopic cholecystectomy. Post op course complicated by episodic emesis. AXR (11/30) with mildly air-filled dilated loops of bowel. Post op course also c/o dizziness and headaches; orthostatics negative.    Plan:   - Regular diet  - Pain control PRN  - Zofran PRN for nausea  - LR 100mL/Hr  - Neurology -> LR 1L/day, migraine medications (toradol, reglan, benadryl, Mg); Meclizine 25mg BID PRN       - f/u outpt  - DVT ppx: [facility]  [id number]

## 2023-12-01 NOTE — DISCHARGE NOTE NURSING/CASE MANAGEMENT/SOCIAL WORK - PATIENT PORTAL LINK FT
You can access the FollowMyHealth Patient Portal offered by [facility] by registering at the following website: [email or web address]. By joining [facility]’s FollowMyHealth portal, you will also be able to view your health information using other applications (apps) compatible with our system.

## 2023-12-04 PROBLEM — E05.00 THYROTOXICOSIS WITH DIFFUSE GOITER WITHOUT THYROTOXIC CRISIS OR STORM: Chronic | Status: ACTIVE | Noted: 2023-11-27

## 2023-12-06 ENCOUNTER — TRANSCRIPTION ENCOUNTER (OUTPATIENT)
Age: 43
End: 2023-12-06

## 2023-12-11 LAB
SURGICAL PATHOLOGY STUDY: SIGNIFICANT CHANGE UP
SURGICAL PATHOLOGY STUDY: SIGNIFICANT CHANGE UP

## 2023-12-14 ENCOUNTER — APPOINTMENT (OUTPATIENT)
Dept: ENDOCRINOLOGY | Facility: CLINIC | Age: 43
End: 2023-12-14

## 2023-12-15 LAB
ALBUMIN SERPL ELPH-MCNC: 4.4 G/DL
ALP BLD-CCNC: 110 U/L
ALT SERPL-CCNC: 105 U/L
AST SERPL-CCNC: 39 U/L
BASOPHILS # BLD AUTO: 0.02 K/UL
BASOPHILS NFR BLD AUTO: 0.3 %
BILIRUB DIRECT SERPL-MCNC: 0.1 MG/DL
BILIRUB INDIRECT SERPL-MCNC: 0.1 MG/DL
BILIRUB SERPL-MCNC: 0.2 MG/DL
EOSINOPHIL # BLD AUTO: 0.22 K/UL
EOSINOPHIL NFR BLD AUTO: 3.1 %
HCT VFR BLD CALC: 39.2 %
HGB BLD-MCNC: 12.4 G/DL
IMM GRANULOCYTES NFR BLD AUTO: 0.3 %
LYMPHOCYTES # BLD AUTO: 1.23 K/UL
LYMPHOCYTES NFR BLD AUTO: 17.5 %
MAN DIFF?: NORMAL
MCHC RBC-ENTMCNC: 27.2 PG
MCHC RBC-ENTMCNC: 31.6 GM/DL
MCV RBC AUTO: 86 FL
MONOCYTES # BLD AUTO: 0.52 K/UL
MONOCYTES NFR BLD AUTO: 7.4 %
NEUTROPHILS # BLD AUTO: 5.03 K/UL
NEUTROPHILS NFR BLD AUTO: 71.4 %
PLATELET # BLD AUTO: 331 K/UL
PROT SERPL-MCNC: 6.7 G/DL
RBC # BLD: 4.56 M/UL
RBC # FLD: 14.2 %
T3FREE SERPL-MCNC: 5.66 PG/ML
T4 FREE SERPL-MCNC: 1.7 NG/DL
TSH SERPL-ACNC: <0.01 UIU/ML
WBC # FLD AUTO: 7.04 K/UL

## 2023-12-18 ENCOUNTER — APPOINTMENT (OUTPATIENT)
Dept: SURGERY | Facility: CLINIC | Age: 43
End: 2023-12-18
Payer: COMMERCIAL

## 2023-12-18 VITALS
SYSTOLIC BLOOD PRESSURE: 109 MMHG | HEART RATE: 86 BPM | TEMPERATURE: 98 F | RESPIRATION RATE: 17 BRPM | OXYGEN SATURATION: 98 % | DIASTOLIC BLOOD PRESSURE: 71 MMHG

## 2023-12-18 PROCEDURE — 99024 POSTOP FOLLOW-UP VISIT: CPT

## 2023-12-18 NOTE — HISTORY OF PRESENT ILLNESS
[de-identified] : Jessy is a 43-year-old female who presents for postoperative visit status post laparoscopic cholecystectomy on November 29.  She is doing well and has been recovering well from surgery. has minimal pain from her incisions. tolerating diet well without nausea, vomiting, or diarrhea. No fever or chills. Only having occasional headaches.

## 2023-12-18 NOTE — PHYSICAL EXAM
[Normal Breath Sounds] : Normal breath sounds [Normal Heart Sounds] : normal heart sounds [No Rash or Lesion] : No rash or lesion [Alert] : alert [Oriented to Person] : oriented to person [Oriented to Place] : oriented to place [Oriented to Time] : oriented to time [Abdominal Masses] : No abdominal masses [Abdomen Tenderness] : ~T ~M No abdominal tenderness [Stool Sample Taken] : No stool obtained  on rectal exam [de-identified] : no acute distress noted [de-identified] : NC/AT [de-identified] : soft, NT, ND well healed x4 laparoscopic incisions

## 2023-12-22 LAB
ALBUMIN SERPL ELPH-MCNC: 4.5 G/DL
ALP BLD-CCNC: 108 U/L
ALT SERPL-CCNC: 71 U/L
AST SERPL-CCNC: 32 U/L
BILIRUB DIRECT SERPL-MCNC: 0.1 MG/DL
BILIRUB INDIRECT SERPL-MCNC: 0.1 MG/DL
BILIRUB SERPL-MCNC: 0.2 MG/DL
PROT SERPL-MCNC: 6.8 G/DL

## 2024-01-08 RX ORDER — METHIMAZOLE 10 MG/1
10 TABLET ORAL
Qty: 135 | Refills: 1 | Status: ACTIVE | COMMUNITY
Start: 2023-10-20 | End: 1900-01-01

## 2024-01-11 DIAGNOSIS — R79.89 OTHER SPECIFIED ABNORMAL FINDINGS OF BLOOD CHEMISTRY: ICD-10-CM

## 2024-01-22 ENCOUNTER — APPOINTMENT (OUTPATIENT)
Dept: SURGERY | Facility: CLINIC | Age: 44
End: 2024-01-22
Payer: COMMERCIAL

## 2024-01-22 VITALS
SYSTOLIC BLOOD PRESSURE: 104 MMHG | HEIGHT: 59 IN | WEIGHT: 136 LBS | RESPIRATION RATE: 17 BRPM | BODY MASS INDEX: 27.42 KG/M2 | HEART RATE: 71 BPM | DIASTOLIC BLOOD PRESSURE: 71 MMHG | TEMPERATURE: 97.4 F | OXYGEN SATURATION: 98 %

## 2024-01-22 DIAGNOSIS — Z09 ENCOUNTER FOR FOLLOW-UP EXAMINATION AFTER COMPLETED TREATMENT FOR CONDITIONS OTHER THAN MALIGNANT NEOPLASM: ICD-10-CM

## 2024-01-22 PROCEDURE — 99024 POSTOP FOLLOW-UP VISIT: CPT

## 2024-01-22 NOTE — HISTORY OF PRESENT ILLNESS
[de-identified] : Jessy is a 43-year-old female who presents for evaluation of palpable suture at incision site.  She is status post laparoscopic cholecystectomy on November 29. The patient reports no pain or discomfort.  She does notice that a tiny suture is palpable at one of her right sided incision sites.

## 2024-01-22 NOTE — PHYSICAL EXAM
[Normal Breath Sounds] : Normal breath sounds [Normal Heart Sounds] : normal heart sounds [Abdominal Masses] : No abdominal masses [Abdomen Tenderness] : ~T ~M No abdominal tenderness [Stool Sample Taken] : No stool obtained  on rectal exam [No Rash or Lesion] : No rash or lesion [Alert] : alert [Oriented to Person] : oriented to person [Oriented to Place] : oriented to place [Oriented to Time] : oriented to time [de-identified] : no acute distress noted [de-identified] : NC/AT [de-identified] : soft, NT, ND well healed x4 laparoscopic incisions Sub-1 mm tail of Monocryl sutures palpable at right sided incision site.

## 2024-03-24 LAB
25(OH)D3 SERPL-MCNC: 29.1 NG/ML
ALBUMIN SERPL ELPH-MCNC: 4.2 G/DL
ALP BLD-CCNC: 67 U/L
ALT SERPL-CCNC: 12 U/L
ANION GAP SERPL CALC-SCNC: 10 MMOL/L
APPEARANCE: CLEAR
AST SERPL-CCNC: 19 U/L
BACTERIA UR CULT: ABNORMAL
BACTERIA: NEGATIVE
BASOPHILS # BLD AUTO: 0.02 K/UL
BASOPHILS NFR BLD AUTO: 0.3 %
BILIRUB SERPL-MCNC: 0.2 MG/DL
BILIRUBIN URINE: NEGATIVE
BLOOD URINE: NEGATIVE
BUN SERPL-MCNC: 15 MG/DL
CALCIUM OXALATE CRYSTALS: ABNORMAL
CALCIUM SERPL-MCNC: 9.5 MG/DL
CHLORIDE SERPL-SCNC: 102 MMOL/L
CHOLEST SERPL-MCNC: 201 MG/DL
CO2 SERPL-SCNC: 25 MMOL/L
COLOR: NORMAL
CREAT SERPL-MCNC: 0.71 MG/DL
EGFR: 107 ML/MIN/1.73M2
EOSINOPHIL # BLD AUTO: 0.69 K/UL
EOSINOPHIL NFR BLD AUTO: 9.7 %
ESTIMATED AVERAGE GLUCOSE: 100 MG/DL
GLUCOSE QUALITATIVE U: NEGATIVE
GLUCOSE SERPL-MCNC: 66 MG/DL
HBA1C MFR BLD HPLC: 5.1 %
HCT VFR BLD CALC: 35.4 %
HDLC SERPL-MCNC: 59 MG/DL
HGB BLD-MCNC: 11.7 G/DL
HYALINE CASTS: 1 /LPF
IMM GRANULOCYTES NFR BLD AUTO: 0.1 %
KETONES URINE: NEGATIVE
LDLC SERPL CALC-MCNC: 130 MG/DL
LEUKOCYTE ESTERASE URINE: NEGATIVE
LYMPHOCYTES # BLD AUTO: 2.11 K/UL
LYMPHOCYTES NFR BLD AUTO: 29.7 %
MAGNESIUM SERPL-MCNC: 2 MG/DL
MAN DIFF?: NORMAL
MCHC RBC-ENTMCNC: 28.7 PG
MCHC RBC-ENTMCNC: 33.1 GM/DL
MCV RBC AUTO: 87 FL
MICROSCOPIC-UA: NORMAL
MONOCYTES # BLD AUTO: 0.7 K/UL
MONOCYTES NFR BLD AUTO: 9.9 %
NEUTROPHILS # BLD AUTO: 3.57 K/UL
NEUTROPHILS NFR BLD AUTO: 50.3 %
NITRITE URINE: NEGATIVE
NONHDLC SERPL-MCNC: 141 MG/DL
PH URINE: 6.5
PHOSPHATE SERPL-MCNC: 3.6 MG/DL
PLATELET # BLD AUTO: 231 K/UL
POTASSIUM SERPL-SCNC: 3.7 MMOL/L
PROT SERPL-MCNC: 6.6 G/DL
PROTEIN URINE: NORMAL
RBC # BLD: 4.07 M/UL
RBC # FLD: 13.5 %
RED BLOOD CELLS URINE: 1 /HPF
SODIUM SERPL-SCNC: 138 MMOL/L
SPECIFIC GRAVITY URINE: 1.02
SQUAMOUS EPITHELIAL CELLS: 2 /HPF
T3RU NFR SERPL: 1.2 TBI
T4 FREE SERPL-MCNC: 1 NG/DL
T4 SERPL-MCNC: 6.8 UG/DL
TRIGL SERPL-MCNC: 58 MG/DL
TSH SERPL-ACNC: 1.18 UIU/ML
URATE SERPL-MCNC: 3.6 MG/DL
UROBILINOGEN URINE: NORMAL
WBC # FLD AUTO: 7.1 K/UL
WHITE BLOOD CELLS URINE: 1 /HPF

## 2024-06-27 ENCOUNTER — NON-APPOINTMENT (OUTPATIENT)
Age: 44
End: 2024-06-27

## 2024-06-27 DIAGNOSIS — E05.00 THYROTOXICOSIS WITH DIFFUSE GOITER W/OUT THYROTOXIC CRISIS OR STORM: ICD-10-CM

## 2024-07-02 ENCOUNTER — NON-APPOINTMENT (OUTPATIENT)
Age: 44
End: 2024-07-02

## 2024-07-04 ENCOUNTER — NON-APPOINTMENT (OUTPATIENT)
Age: 44
End: 2024-07-04

## 2024-07-08 DIAGNOSIS — K76.89 OTHER SPECIFIED DISEASES OF LIVER: ICD-10-CM

## 2024-08-28 ENCOUNTER — NON-APPOINTMENT (OUTPATIENT)
Age: 44
End: 2024-08-28

## 2024-09-04 ENCOUNTER — NON-APPOINTMENT (OUTPATIENT)
Age: 44
End: 2024-09-04

## 2024-09-24 ENCOUNTER — APPOINTMENT (OUTPATIENT)
Dept: ENDOCRINOLOGY | Facility: CLINIC | Age: 44
End: 2024-09-24
Payer: COMMERCIAL

## 2024-09-24 VITALS
OXYGEN SATURATION: 99 % | HEART RATE: 86 BPM | WEIGHT: 140 LBS | HEIGHT: 59 IN | DIASTOLIC BLOOD PRESSURE: 70 MMHG | RESPIRATION RATE: 18 BRPM | SYSTOLIC BLOOD PRESSURE: 120 MMHG | BODY MASS INDEX: 28.22 KG/M2

## 2024-09-24 DIAGNOSIS — E55.9 VITAMIN D DEFICIENCY, UNSPECIFIED: ICD-10-CM

## 2024-09-24 DIAGNOSIS — E05.00 THYROTOXICOSIS WITH DIFFUSE GOITER W/OUT THYROTOXIC CRISIS OR STORM: ICD-10-CM

## 2024-09-24 DIAGNOSIS — R79.89 OTHER SPECIFIED ABNORMAL FINDINGS OF BLOOD CHEMISTRY: ICD-10-CM

## 2024-09-24 DIAGNOSIS — E04.1 NONTOXIC SINGLE THYROID NODULE: ICD-10-CM

## 2024-09-24 PROCEDURE — 99214 OFFICE O/P EST MOD 30 MIN: CPT

## 2024-09-24 NOTE — ADDENDUM
[FreeTextEntry1] : This note was written by Darrell Ogden on 09/24/2024 acting as medical scribe for Dr. Gonzales Charles. I, Dr. Gonzales Charles, have read and attest that all the information, medical decision making and discharge instructions within are true and accurate.

## 2024-09-24 NOTE — HISTORY OF PRESENT ILLNESS
[FreeTextEntry1] : Ms. NEVILLE is a 43-year-old female who returns today for follow-up endocrine evaluation with regard to a history of Grave's disease.   No ophthalmologic s/s except some mild incr in tearing.  In oct 2023 she was restarted on Methimazole 15 mg as TSH <0.01 (prior had been off for 10-15 years). We did start to titrate down as TFTs were stabilizing and ultimately, she was taken off methimazole in June 2024 when TSH resulted 4.43. Recent labs on 09/10/2024 with TSH at 1.29 off the medication.  ***patient is requesting to make sure that she is started on a low dose of Methimazole if it is ever needed again in the future***  She denies any palpitations, sob, tremors, anxiety, weight changes, temperature intolerance, severe fatigue, change in bowel habits, changes in mood. she also denies skin, hair, or nail changes.   Thyroid US 7/2/24 - lateral mid lower pole right thyroid lobe 0.4 x 0.4 x 0.4 cm nodule Tr3 - enlarged thyroid gland Denies anterior neck discomfort, difficulty swallowing, or any change in the appearance of the neck region.   menses regular with copper IUD  Too had LFT elevations related to gallstones   Mother and father have primary hyperparathyroidism . no family hx of hyperthyroidism ____________________________________________________________________________________________________  Additional medical history is otherwise negative. Taking vitamin D intermittently.

## 2024-12-10 ENCOUNTER — APPOINTMENT (OUTPATIENT)
Dept: CARDIOLOGY | Facility: CLINIC | Age: 44
End: 2024-12-10
Payer: COMMERCIAL

## 2024-12-10 ENCOUNTER — APPOINTMENT (OUTPATIENT)
Dept: ENDOCRINOLOGY | Facility: CLINIC | Age: 44
End: 2024-12-10
Payer: COMMERCIAL

## 2024-12-10 VITALS
HEIGHT: 59 IN | SYSTOLIC BLOOD PRESSURE: 130 MMHG | BODY MASS INDEX: 28.22 KG/M2 | DIASTOLIC BLOOD PRESSURE: 70 MMHG | TEMPERATURE: 98.4 F | WEIGHT: 140 LBS | HEART RATE: 69 BPM | OXYGEN SATURATION: 99 %

## 2024-12-10 VITALS
DIASTOLIC BLOOD PRESSURE: 72 MMHG | HEIGHT: 59 IN | HEART RATE: 76 BPM | BODY MASS INDEX: 28.22 KG/M2 | SYSTOLIC BLOOD PRESSURE: 110 MMHG | WEIGHT: 140 LBS | OXYGEN SATURATION: 99 %

## 2024-12-10 DIAGNOSIS — Z12.4 ENCOUNTER FOR SCREENING FOR MALIGNANT NEOPLASM OF CERVIX: ICD-10-CM

## 2024-12-10 DIAGNOSIS — D22.9 MELANOCYTIC NEVI, UNSPECIFIED: ICD-10-CM

## 2024-12-10 DIAGNOSIS — Z12.39 ENCOUNTER FOR OTHER SCREENING FOR MALIGNANT NEOPLASM OF BREAST: ICD-10-CM

## 2024-12-10 DIAGNOSIS — Z00.00 ENCOUNTER FOR GENERAL ADULT MEDICAL EXAMINATION W/OUT ABNORMAL FINDINGS: ICD-10-CM

## 2024-12-10 DIAGNOSIS — R06.09 OTHER FORMS OF DYSPNEA: ICD-10-CM

## 2024-12-10 DIAGNOSIS — E55.9 VITAMIN D DEFICIENCY, UNSPECIFIED: ICD-10-CM

## 2024-12-10 DIAGNOSIS — R00.2 PALPITATIONS: ICD-10-CM

## 2024-12-10 DIAGNOSIS — R53.83 OTHER FATIGUE: ICD-10-CM

## 2024-12-10 DIAGNOSIS — E05.00 THYROTOXICOSIS WITH DIFFUSE GOITER W/OUT THYROTOXIC CRISIS OR STORM: ICD-10-CM

## 2024-12-10 DIAGNOSIS — Z13.228 ENCOUNTER FOR SCREENING FOR OTHER METABOLIC DISORDERS: ICD-10-CM

## 2024-12-10 DIAGNOSIS — E04.9 NONTOXIC GOITER, UNSPECIFIED: ICD-10-CM

## 2024-12-10 DIAGNOSIS — R14.0 ABDOMINAL DISTENSION (GASEOUS): ICD-10-CM

## 2024-12-10 DIAGNOSIS — E04.1 NONTOXIC SINGLE THYROID NODULE: ICD-10-CM

## 2024-12-10 DIAGNOSIS — Z71.85 ENCOUNTER FOR IMMUNIZATION SAFETY COUNSELING: ICD-10-CM

## 2024-12-10 DIAGNOSIS — K59.00 CONSTIPATION, UNSPECIFIED: ICD-10-CM

## 2024-12-10 DIAGNOSIS — R79.89 OTHER SPECIFIED ABNORMAL FINDINGS OF BLOOD CHEMISTRY: ICD-10-CM

## 2024-12-10 PROCEDURE — 99214 OFFICE O/P EST MOD 30 MIN: CPT

## 2024-12-10 PROCEDURE — 93000 ELECTROCARDIOGRAM COMPLETE: CPT

## 2024-12-10 PROCEDURE — G2211 COMPLEX E/M VISIT ADD ON: CPT

## 2024-12-10 PROCEDURE — 99396 PREV VISIT EST AGE 40-64: CPT

## 2024-12-10 RX ORDER — LORATADINE 5 MG
17 TABLET,CHEWABLE ORAL DAILY
Qty: 1 | Refills: 0 | Status: ACTIVE | COMMUNITY
Start: 2024-12-10

## 2024-12-11 LAB
25(OH)D3 SERPL-MCNC: 35.7 NG/ML
ALBUMIN SERPL ELPH-MCNC: 4.8 G/DL
ALBUMIN SERPL ELPH-MCNC: 4.8 G/DL
ALP BLD-CCNC: 92 U/L
ALP BLD-CCNC: 98 U/L
ALT SERPL-CCNC: 16 U/L
ALT SERPL-CCNC: 18 U/L
AMORPHOUS PHOSPHATE CRYSTALS: PRESENT
ANION GAP SERPL CALC-SCNC: 13 MMOL/L
APPEARANCE: ABNORMAL
AST SERPL-CCNC: 21 U/L
AST SERPL-CCNC: 22 U/L
BACTERIA: ABNORMAL /HPF
BASOPHILS # BLD AUTO: 0.02 K/UL
BASOPHILS NFR BLD AUTO: 0.3 %
BILIRUB DIRECT SERPL-MCNC: 0.1 MG/DL
BILIRUB INDIRECT SERPL-MCNC: 0.2 MG/DL
BILIRUB SERPL-MCNC: 0.3 MG/DL
BILIRUB SERPL-MCNC: 0.3 MG/DL
BILIRUBIN URINE: NEGATIVE
BLOOD URINE: NEGATIVE
BUN SERPL-MCNC: 12 MG/DL
CALCIUM SERPL-MCNC: 10.4 MG/DL
CAST: 0 /LPF
CHLORIDE SERPL-SCNC: 101 MMOL/L
CO2 SERPL-SCNC: 26 MMOL/L
COLOR: YELLOW
CREAT SERPL-MCNC: 0.73 MG/DL
EGFR: 103 ML/MIN/1.73M2
EOSINOPHIL # BLD AUTO: 0.04 K/UL
EOSINOPHIL NFR BLD AUTO: 0.6 %
EPITHELIAL CELLS: 18 /HPF
ESTIMATED AVERAGE GLUCOSE: 94 MG/DL
FERRITIN SERPL-MCNC: 15 NG/ML
FOLATE SERPL-MCNC: 17.4 NG/ML
GLUCOSE QUALITATIVE U: NEGATIVE MG/DL
GLUCOSE SERPL-MCNC: 83 MG/DL
HAPTOGLOB SERPL-MCNC: 114 MG/DL
HBA1C MFR BLD HPLC: 4.9 %
HCG SERPL-MCNC: <1 MIU/ML
HCT VFR BLD CALC: 41 %
HGB BLD-MCNC: 13.1 G/DL
IMM GRANULOCYTES NFR BLD AUTO: 0.3 %
IRON SERPL-MCNC: 106 UG/DL
KETONES URINE: NEGATIVE MG/DL
LDH SERPL-CCNC: 147 U/L
LEUKOCYTE ESTERASE URINE: ABNORMAL
LYMPHOCYTES # BLD AUTO: 1.55 K/UL
LYMPHOCYTES NFR BLD AUTO: 23.5 %
MAN DIFF?: NORMAL
MCHC RBC-ENTMCNC: 28.4 PG
MCHC RBC-ENTMCNC: 32 G/DL
MCV RBC AUTO: 88.7 FL
MICROSCOPIC-UA: NORMAL
MONOCYTES # BLD AUTO: 0.44 K/UL
MONOCYTES NFR BLD AUTO: 6.7 %
NEUTROPHILS # BLD AUTO: 4.52 K/UL
NEUTROPHILS NFR BLD AUTO: 68.6 %
NITRITE URINE: NEGATIVE
PH URINE: 7.5
PLATELET # BLD AUTO: 291 K/UL
POTASSIUM SERPL-SCNC: 4 MMOL/L
PROT SERPL-MCNC: 7.2 G/DL
PROT SERPL-MCNC: 7.3 G/DL
PROTEIN URINE: NEGATIVE MG/DL
RBC # BLD: 4.62 M/UL
RBC # FLD: 14.1 %
RED BLOOD CELLS URINE: 0 /HPF
REVIEW: NORMAL
SODIUM SERPL-SCNC: 140 MMOL/L
SPECIFIC GRAVITY URINE: 1.02
T3FREE SERPL-MCNC: 2.91 PG/ML
T4 FREE SERPL-MCNC: 1.2 NG/DL
THYROGLOB AB SERPL-ACNC: 61.2 IU/ML
THYROPEROXIDASE AB SERPL IA-ACNC: 45.7 IU/ML
TRANSFERRIN SERPL-MCNC: 301 MG/DL
TSH RECEPTOR AB: 1.21 IU/L
TSH SERPL-ACNC: 1.62 UIU/ML
UROBILINOGEN URINE: 0.2 MG/DL
VIT B12 SERPL-MCNC: 585 PG/ML
WBC # FLD AUTO: 6.59 K/UL
WHITE BLOOD CELLS URINE: 2 /HPF

## 2024-12-13 LAB — TSI ACT/NOR SER: 0.93 IU/L

## 2024-12-23 ENCOUNTER — NON-APPOINTMENT (OUTPATIENT)
Age: 44
End: 2024-12-23

## 2025-01-03 DIAGNOSIS — R39.9 UNSPECIFIED SYMPTOMS AND SIGNS INVOLVING THE GENITOURINARY SYSTEM: ICD-10-CM

## 2025-01-03 RX ORDER — CEFDINIR 300 MG/1
300 CAPSULE ORAL
Qty: 14 | Refills: 0 | Status: ACTIVE | COMMUNITY
Start: 2025-01-03 | End: 1900-01-01

## 2025-02-20 ENCOUNTER — NON-APPOINTMENT (OUTPATIENT)
Age: 45
End: 2025-02-20

## (undated) DEVICE — DRSG CURITY GAUZE SPONGE 4 X 4" 12-PLY

## (undated) DEVICE — DRSG TEGADERM 4X4.75"

## (undated) DEVICE — GOWN TRIMAX LG

## (undated) DEVICE — TROCAR COVIDIEN VERSAPORT BLADELESS OPTICAL 12MM STANDARD

## (undated) DEVICE — GLV 7 PROTEXIS (WHITE)

## (undated) DEVICE — SOL IRR POUR H2O 250ML

## (undated) DEVICE — DRAPE TOWEL BLUE 17" X 24"

## (undated) DEVICE — GLV 8 PROTEXIS (WHITE)

## (undated) DEVICE — POSITIONER FOAM EGG CRATE ULNAR 2PCS (PINK)

## (undated) DEVICE — BALLOON US ENDO

## (undated) DEVICE — PREP CHLORAPREP HI-LITE ORANGE 26ML

## (undated) DEVICE — SYR ALLIANCE II INFLATION 60ML

## (undated) DEVICE — BITE BLOCK ADULT 20 X 27MM (GREEN)

## (undated) DEVICE — BLADE SCALPEL SAFETYLOCK #11

## (undated) DEVICE — SENSOR O2 FINGER ADULT

## (undated) DEVICE — PACK LAP CHOLE LAP APPENDECTOMY

## (undated) DEVICE — STOPCOCK 4-WAY W SWIVEL MALE LUER LOCK NON VENTED RED CAP

## (undated) DEVICE — TUBING SUCTION CONN 6FT STERILE

## (undated) DEVICE — FOLEY HOLDER STATLOCK 2 WAY ADULT

## (undated) DEVICE — CATH IV SAFE BC 20G X 1.16" (PINK)

## (undated) DEVICE — DRSG OPSITE 2.5 X 2"

## (undated) DEVICE — BIOPSY FORCEP RADIAL JAW 4 STANDARD WITH NEEDLE

## (undated) DEVICE — SUCTION YANKAUER NO CONTROL VENT

## (undated) DEVICE — GLV 7.5 PROTEXIS (WHITE)

## (undated) DEVICE — SUT MONOCRYL 4-0 18" PS-2

## (undated) DEVICE — PACK IV START WITH CHG

## (undated) DEVICE — ENDOCATCH 10MM SPECIMEN POUCH

## (undated) DEVICE — TUBING STRYKEFLOW II SUCTION / IRRIGATOR

## (undated) DEVICE — TUBING TRUWAVE PRESSURE MALE/FEMALE 72"

## (undated) DEVICE — GLV 8.5 PROTEXIS (WHITE)

## (undated) DEVICE — TUBING PLUME AWAY 4.0

## (undated) DEVICE — SUT PDS II 0 18" ENDOLOOP LIGATURE

## (undated) DEVICE — SOL INJ NS 0.9% 500ML 2 PORT

## (undated) DEVICE — DRSG STERISTRIPS 0.5 X 4"

## (undated) DEVICE — CATH IV SAFE BC 22G X 1" (BLUE)

## (undated) DEVICE — TUBING SUCTION 20FT

## (undated) DEVICE — GLV 6.5 PROTEXIS (WHITE)

## (undated) DEVICE — SUT POLYSORB 0 60" TIES UNDYED

## (undated) DEVICE — INSUFFLATION NDL COVIDIEN STEP 14G FOR STEP/VERSASTEP

## (undated) DEVICE — ELCTR CORD FOOTSWITCH 1PLR LAPSCP 10FT

## (undated) DEVICE — WARMING BLANKET UPPER ADULT

## (undated) DEVICE — TROCAR COVIDIEN VERSAPORT BLADELESS OPTICAL 5MM STANDARD

## (undated) DEVICE — VENODYNE/SCD SLEEVE CALF LARGE

## (undated) DEVICE — SPECIMEN CONTAINER 100ML

## (undated) DEVICE — SOL IRR POUR NS 0.9% 500ML

## (undated) DEVICE — MEDICATION LABELS W MARKER

## (undated) DEVICE — SHEARS COVIDIEN ENDO SHEAR 5MM X 31CM W UNIPOLAR CAUTERY

## (undated) DEVICE — TUBING IV SET GRAVITY 3Y 100" MACRO